# Patient Record
Sex: MALE | Race: WHITE | NOT HISPANIC OR LATINO
[De-identification: names, ages, dates, MRNs, and addresses within clinical notes are randomized per-mention and may not be internally consistent; named-entity substitution may affect disease eponyms.]

---

## 2019-10-15 ENCOUNTER — RECORD ABSTRACTING (OUTPATIENT)
Age: 64
End: 2019-10-15

## 2019-10-18 ENCOUNTER — APPOINTMENT (OUTPATIENT)
Dept: ELECTROPHYSIOLOGY | Facility: CLINIC | Age: 64
End: 2019-10-18
Payer: COMMERCIAL

## 2019-10-18 ENCOUNTER — NON-APPOINTMENT (OUTPATIENT)
Age: 64
End: 2019-10-18

## 2019-10-18 VITALS — SYSTOLIC BLOOD PRESSURE: 138 MMHG | DIASTOLIC BLOOD PRESSURE: 90 MMHG

## 2019-10-18 VITALS
HEIGHT: 72 IN | SYSTOLIC BLOOD PRESSURE: 148 MMHG | DIASTOLIC BLOOD PRESSURE: 98 MMHG | HEART RATE: 78 BPM | BODY MASS INDEX: 32.78 KG/M2 | OXYGEN SATURATION: 98 % | WEIGHT: 242 LBS

## 2019-10-18 DIAGNOSIS — Z98.890 OTHER SPECIFIED POSTPROCEDURAL STATES: ICD-10-CM

## 2019-10-18 DIAGNOSIS — Z87.19 OTHER SPECIFIED POSTPROCEDURAL STATES: ICD-10-CM

## 2019-10-18 DIAGNOSIS — Z78.9 OTHER SPECIFIED HEALTH STATUS: ICD-10-CM

## 2019-10-18 DIAGNOSIS — Z85.831 PERSONAL HISTORY OF MALIGNANT NEOPLASM OF SOFT TISSUE: ICD-10-CM

## 2019-10-18 DIAGNOSIS — Z82.3 FAMILY HISTORY OF STROKE: ICD-10-CM

## 2019-10-18 DIAGNOSIS — Z86.79 OTHER SPECIFIED POSTPROCEDURAL STATES: ICD-10-CM

## 2019-10-18 PROCEDURE — 99205 OFFICE O/P NEW HI 60 MIN: CPT

## 2019-10-18 PROCEDURE — 93000 ELECTROCARDIOGRAM COMPLETE: CPT

## 2019-10-18 RX ORDER — LOSARTAN POTASSIUM 50 MG/1
50 TABLET, FILM COATED ORAL DAILY
Refills: 0 | Status: ACTIVE | COMMUNITY

## 2019-10-18 NOTE — DISCUSSION/SUMMARY
[FreeTextEntry1] : In summary Eloy Jovel is a 63 y/o [place] [employer] and he is here because of recurrent atrial fibrillation.  He went for routine visit with PCP 3 weeks ago who detected afib and referred him to electrophysiology. Originally had afib detected 2011, cardioverted x3-4 times between 4264-3606.  Underwent afib ablation at [facility] in 2012, 2nd ablation at [place] 2015. One subsequent cardioversion the year following 2015.Since then intermittent episodes of PAF.\par History of Gastric bleed detected by endoscopy in 2017, had stopped Pradaxa ~6 months prior to that then switched over to Xarelto of which he stopped taking 3 years ago. Last stress test 3 years ago, and last ECHO 2018 revealing EF 59%.He feels well. Denies palpitations, denies exercise intolerance, denies dizziness or fainting. Goes to the gym regularly-4 times a week and karate once a week\par \par I recommended that he undergo a GI evaluation to risk stratify for anticoagulation.  If AC allowed, then JAMIE cardioversion (patient prefers to be in sinus rhythm).  If AC off ASA.  Risks, benefits and alternatives of cardioversion and anticoagulation discussed with patient and he agrees to see gastroenterologist first. \par \par Sincerely,\par \par Louis Purcell MD

## 2019-10-18 NOTE — PHYSICAL EXAM
[General Appearance - Well Developed] : well developed [Well Groomed] : well groomed [Normal Appearance] : normal appearance [General Appearance - Well Nourished] : well nourished [No Deformities] : no deformities [Normal Conjunctiva] : the conjunctiva exhibited no abnormalities [General Appearance - In No Acute Distress] : no acute distress [Eyelids - No Xanthelasma] : the eyelids demonstrated no xanthelasmas [Normal Oral Mucosa] : normal oral mucosa [No Oral Pallor] : no oral pallor [No Oral Cyanosis] : no oral cyanosis [Normal Jugular Venous A Waves Present] : normal jugular venous A waves present [Normal Jugular Venous V Waves Present] : normal jugular venous V waves present [No Jugular Venous Henao A Waves] : no jugular venous henao A waves [Heart Sounds] : normal S1 and S2 [Murmurs] : no murmurs present [Exaggerated Use Of Accessory Muscles For Inspiration] : no accessory muscle use [Respiration, Rhythm And Depth] : normal respiratory rhythm and effort [Abdomen Soft] : soft [Auscultation Breath Sounds / Voice Sounds] : lungs were clear to auscultation bilaterally [Abdomen Tenderness] : non-tender [Abdomen Mass (___ Cm)] : no abdominal mass palpated [Abnormal Walk] : normal gait [Gait - Sufficient For Exercise Testing] : the gait was sufficient for exercise testing [Nail Clubbing] : no clubbing of the fingernails [Cyanosis, Localized] : no localized cyanosis [Petechial Hemorrhages (___cm)] : no petechial hemorrhages [Skin Color & Pigmentation] : normal skin color and pigmentation [] : no ischemic changes [No Venous Stasis] : no venous stasis [Skin Lesions] : no skin lesions [No Skin Ulcers] : no skin ulcer [No Xanthoma] : no  xanthoma was observed [Oriented To Time, Place, And Person] : oriented to person, place, and time [Affect] : the affect was normal [Mood] : the mood was normal [No Anxiety] : not feeling anxious [FreeTextEntry1] : Irregularly irregular

## 2019-10-18 NOTE — HISTORY OF PRESENT ILLNESS
[FreeTextEntry1] : Perry Frankel, MD\par Dr Malou Wtats\par \par Dear Unruly,\par \par I saw Eloy Jovel on October 18, 2019.  As you know he is a 65 y/o [place] [employer] and he is here because of recurrent atrial fibrillation.  He went for routine visit with PCP 3 weeks ago who detected afib and referred him to electrophysiology. Originally had afib detected 2011, cardioverted x3-4 times between [id number].  Underwent afib ablation at [facility] in 2012, 2nd ablation at [facility] 2015. One subsequent cardioversion the year following 2015.Since then intermittent episodes of PAF.\par History of Gastric bleed detected by endoscopy in 2017, had stopped Pradaxa ~6 months prior to that then switched over to Xarelto of which he stopped taking 3 years ago. Last stress test 3 years ago, and last ECHO 2018 revealing EF 59%.He feels well. Denies palpitations, denies exercise intolerance, denies dizziness or fainting. Goes to the gym regularly-4 times a week and karate once a week

## 2019-10-18 NOTE — REVIEW OF SYSTEMS
[Palpitations] : palpitations [Abdominal Pain] : abdominal pain [Nausea] : nausea [Heartburn] : heartburn [Negative] : Heme/Lymph

## 2020-02-06 ENCOUNTER — INPATIENT (INPATIENT)
Facility: HOSPITAL | Age: 65
LOS: 0 days | Discharge: HOME | DRG: 247 | End: 2020-02-07
Attending: INTERNAL MEDICINE | Admitting: INTERNAL MEDICINE
Payer: COMMERCIAL

## 2020-02-06 ENCOUNTER — TRANSCRIPTION ENCOUNTER (OUTPATIENT)
Age: 65
End: 2020-02-06

## 2020-02-06 VITALS
OXYGEN SATURATION: 96 % | RESPIRATION RATE: 18 BRPM | DIASTOLIC BLOOD PRESSURE: 89 MMHG | HEIGHT: 72 IN | WEIGHT: 240.08 LBS | SYSTOLIC BLOOD PRESSURE: 125 MMHG | HEART RATE: 92 BPM | TEMPERATURE: 98 F

## 2020-02-06 DIAGNOSIS — I21.4 NON-ST ELEVATION (NSTEMI) MYOCARDIAL INFARCTION: ICD-10-CM

## 2020-02-06 DIAGNOSIS — K41.20 BILATERAL FEMORAL HERNIA, WITHOUT OBSTRUCTION OR GANGRENE, NOT SPECIFIED AS RECURRENT: Chronic | ICD-10-CM

## 2020-02-06 DIAGNOSIS — Z98.890 OTHER SPECIFIED POSTPROCEDURAL STATES: Chronic | ICD-10-CM

## 2020-02-06 LAB
ALBUMIN SERPL ELPH-MCNC: 3.9 G/DL — SIGNIFICANT CHANGE UP (ref 3.3–5)
ALP SERPL-CCNC: 75 U/L — SIGNIFICANT CHANGE UP (ref 40–120)
ALT FLD-CCNC: 26 U/L — SIGNIFICANT CHANGE UP (ref 10–45)
ANION GAP SERPL CALC-SCNC: 12 MMOL/L — SIGNIFICANT CHANGE UP (ref 5–17)
APTT BLD: 46.4 SEC — HIGH (ref 27.5–36.3)
AST SERPL-CCNC: 88 U/L — HIGH (ref 10–40)
BILIRUB SERPL-MCNC: 1.3 MG/DL — HIGH (ref 0.2–1.2)
BLD GP AB SCN SERPL QL: NEGATIVE — SIGNIFICANT CHANGE UP
BUN SERPL-MCNC: 16 MG/DL — SIGNIFICANT CHANGE UP (ref 7–23)
CALCIUM SERPL-MCNC: 9.2 MG/DL — SIGNIFICANT CHANGE UP (ref 8.4–10.5)
CHLORIDE SERPL-SCNC: 101 MMOL/L — SIGNIFICANT CHANGE UP (ref 96–108)
CO2 SERPL-SCNC: 25 MMOL/L — SIGNIFICANT CHANGE UP (ref 22–31)
CREAT SERPL-MCNC: 1.06 MG/DL — SIGNIFICANT CHANGE UP (ref 0.5–1.3)
GLUCOSE SERPL-MCNC: 107 MG/DL — HIGH (ref 70–99)
HCT VFR BLD CALC: 48 % — SIGNIFICANT CHANGE UP (ref 39–50)
HGB BLD-MCNC: 16 G/DL — SIGNIFICANT CHANGE UP (ref 13–17)
INR BLD: 1.15 RATIO — SIGNIFICANT CHANGE UP (ref 0.88–1.16)
MCHC RBC-ENTMCNC: 30.4 PG — SIGNIFICANT CHANGE UP (ref 27–34)
MCHC RBC-ENTMCNC: 33.3 GM/DL — SIGNIFICANT CHANGE UP (ref 32–36)
MCV RBC AUTO: 91.3 FL — SIGNIFICANT CHANGE UP (ref 80–100)
NRBC # BLD: 0 /100 WBCS — SIGNIFICANT CHANGE UP (ref 0–0)
NT-PROBNP SERPL-SCNC: 808 PG/ML — HIGH (ref 0–300)
PLATELET # BLD AUTO: 211 K/UL — SIGNIFICANT CHANGE UP (ref 150–400)
POTASSIUM SERPL-MCNC: 3.8 MMOL/L — SIGNIFICANT CHANGE UP (ref 3.5–5.3)
POTASSIUM SERPL-SCNC: 3.8 MMOL/L — SIGNIFICANT CHANGE UP (ref 3.5–5.3)
PROT SERPL-MCNC: 6.9 G/DL — SIGNIFICANT CHANGE UP (ref 6–8.3)
PROTHROM AB SERPL-ACNC: 13.2 SEC — HIGH (ref 10–12.9)
RBC # BLD: 5.26 M/UL — SIGNIFICANT CHANGE UP (ref 4.2–5.8)
RBC # FLD: 12.5 % — SIGNIFICANT CHANGE UP (ref 10.3–14.5)
RH IG SCN BLD-IMP: POSITIVE — SIGNIFICANT CHANGE UP
SODIUM SERPL-SCNC: 138 MMOL/L — SIGNIFICANT CHANGE UP (ref 135–145)
TROPONIN T, HIGH SENSITIVITY RESULT: 599 NG/L — HIGH (ref 0–51)
WBC # BLD: 10.36 K/UL — SIGNIFICANT CHANGE UP (ref 3.8–10.5)
WBC # FLD AUTO: 10.36 K/UL — SIGNIFICANT CHANGE UP (ref 3.8–10.5)

## 2020-02-06 PROCEDURE — 71045 X-RAY EXAM CHEST 1 VIEW: CPT | Mod: 26

## 2020-02-06 PROCEDURE — 99291 CRITICAL CARE FIRST HOUR: CPT

## 2020-02-06 PROCEDURE — 92941 PRQ TRLML REVSC TOT OCCL AMI: CPT | Mod: LC

## 2020-02-06 PROCEDURE — 99152 MOD SED SAME PHYS/QHP 5/>YRS: CPT

## 2020-02-06 PROCEDURE — 93010 ELECTROCARDIOGRAM REPORT: CPT

## 2020-02-06 PROCEDURE — 93454 CORONARY ARTERY ANGIO S&I: CPT | Mod: 26,59

## 2020-02-06 RX ORDER — TICAGRELOR 90 MG/1
90 TABLET ORAL
Refills: 0 | Status: DISCONTINUED | OUTPATIENT
Start: 2020-02-06 | End: 2020-02-06

## 2020-02-06 RX ORDER — ONDANSETRON 8 MG/1
4 TABLET, FILM COATED ORAL ONCE
Refills: 0 | Status: COMPLETED | OUTPATIENT
Start: 2020-02-06 | End: 2020-02-06

## 2020-02-06 RX ORDER — TICAGRELOR 90 MG/1
90 TABLET ORAL EVERY 12 HOURS
Refills: 0 | Status: DISCONTINUED | OUTPATIENT
Start: 2020-02-06 | End: 2020-02-07

## 2020-02-06 RX ORDER — PANTOPRAZOLE SODIUM 20 MG/1
40 TABLET, DELAYED RELEASE ORAL
Refills: 0 | Status: DISCONTINUED | OUTPATIENT
Start: 2020-02-06 | End: 2020-02-07

## 2020-02-06 RX ORDER — ASPIRIN/CALCIUM CARB/MAGNESIUM 324 MG
81 TABLET ORAL DAILY
Refills: 0 | Status: DISCONTINUED | OUTPATIENT
Start: 2020-02-07 | End: 2020-02-07

## 2020-02-06 RX ORDER — HEPARIN SODIUM 5000 [USP'U]/ML
6000 INJECTION INTRAVENOUS; SUBCUTANEOUS EVERY 6 HOURS
Refills: 0 | Status: DISCONTINUED | OUTPATIENT
Start: 2020-02-06 | End: 2020-02-06

## 2020-02-06 RX ORDER — HEPARIN SODIUM 5000 [USP'U]/ML
INJECTION INTRAVENOUS; SUBCUTANEOUS
Qty: 25000 | Refills: 0 | Status: DISCONTINUED | OUTPATIENT
Start: 2020-02-06 | End: 2020-02-06

## 2020-02-06 RX ORDER — SIMVASTATIN 20 MG/1
40 TABLET, FILM COATED ORAL AT BEDTIME
Refills: 0 | Status: DISCONTINUED | OUTPATIENT
Start: 2020-02-06 | End: 2020-02-06

## 2020-02-06 RX ORDER — LOSARTAN POTASSIUM 100 MG/1
50 TABLET, FILM COATED ORAL DAILY
Refills: 0 | Status: DISCONTINUED | OUTPATIENT
Start: 2020-02-06 | End: 2020-02-07

## 2020-02-06 RX ORDER — TICAGRELOR 90 MG/1
1 TABLET ORAL
Qty: 60 | Refills: 0
Start: 2020-02-06 | End: 2020-03-06

## 2020-02-06 RX ORDER — ATORVASTATIN CALCIUM 80 MG/1
40 TABLET, FILM COATED ORAL AT BEDTIME
Refills: 0 | Status: DISCONTINUED | OUTPATIENT
Start: 2020-02-06 | End: 2020-02-07

## 2020-02-06 RX ORDER — METOPROLOL TARTRATE 50 MG
25 TABLET ORAL DAILY
Refills: 0 | Status: DISCONTINUED | OUTPATIENT
Start: 2020-02-06 | End: 2020-02-07

## 2020-02-06 RX ADMIN — Medication 30 MILLILITER(S): at 17:54

## 2020-02-06 RX ADMIN — Medication 25 MILLIGRAM(S): at 17:54

## 2020-02-06 RX ADMIN — TICAGRELOR 90 MILLIGRAM(S): 90 TABLET ORAL at 17:54

## 2020-02-06 RX ADMIN — HEPARIN SODIUM 1000 UNIT(S)/HR: 5000 INJECTION INTRAVENOUS; SUBCUTANEOUS at 09:18

## 2020-02-06 RX ADMIN — ONDANSETRON 4 MILLIGRAM(S): 8 TABLET, FILM COATED ORAL at 09:09

## 2020-02-06 RX ADMIN — ATORVASTATIN CALCIUM 40 MILLIGRAM(S): 80 TABLET, FILM COATED ORAL at 21:38

## 2020-02-06 NOTE — ED PROVIDER NOTE - CLINICAL SUMMARY MEDICAL DECISION MAKING FREE TEXT BOX
Attending MD Millan: 64M with afib (not on AC), HLD presenting as transfer from OSH for NSTEMI, sp ASA and ticagrelor loading with heparin bolus/gtt. On nitro gtt as well. ECG on arrival without diagnostic ischemic changes but review of ECGs from [facility] with ?hyperacute T waves inferiorly and anteriorly. Cardiology consult called on arrival, still with some mild epigastric discomfort. Cath consult called, will continue medical therapies for now until cardiac cath which is tentatively planned for this morning Attending MD Millan: 64M with afib (not on AC), HLD presenting as transfer from OSH for NSTEMI, sp ASA and ticagrelor loading with heparin bolus/gtt. On nitro gtt as well. ECG on arrival without diagnostic ischemic changes but review of ECGs from [facility] with ?hyperacute T waves inferiorly and anteriorly. Cardiology consult called on arrival, still with some mild epigastric discomfort. Cath consult called, will continue medical therapies for now until cardiac cath which is tentatively planned for this morning      Resident: Pt p/w chest pain in setting of NSTEMI transferred for cath consult, given risk factors high risk for ACS and with elevated troponin at OSH. Plan: Cards c/s, rpt EKG, monitoring, admission.  pt already received two different antiplatelet loading doses at OSH and declined aspirin there.

## 2020-02-06 NOTE — H&P CARDIOLOGY - HISTORY OF PRESENT ILLNESS
64 Male with PMHx of  HTN HLD, AF s/p twice, ablation last in 2015 ( not on AC)  p/w 1 week of substernal chest pain radiates ot the left shoulder and back. Pain became worse last night after going to a karate class, did not resolve after resting , then decided to go  to [facility] where was found to have NSTEMI and transferred for cath consult. Patient received ASA 81 (refused rest of dose), plavix 300, brilinta 180, heparin drip, NTG drip, lipitor and protonix at [facility]. Has associated nausea and feeling generally unwell, with mild chest pain at this time.  Patient came to CRS with heparin gtt and NTG gtt. Both d/tanja on arrival to recovery room. Patient is c/o mild chest pain 1/10 now. 64 Male with PMHx of  HTN HLD, AF s/p twice, ablation last in 2015 ( not on AC)  p/w 1 week of substernal chest pain radiates ot the left shoulder and back. Pain became worse last night after going to a karate class, did not resolve after resting , then decided to go  to [facility] where was found to have NSTEMI and transferred for cath consult. Patient received ASA 81 (refused rest of dose), plavix 300, brilinta 180, heparin drip, NTG drip, lipitor and protonix at [facility]. Has associated nausea and feeling generally unwell, with mild chest pain at this time. Troponin is 599 now.  Patient came to CRS with heparin gtt and NTG gtt. Both d/tanja on arrival to recovery room. Patient is c/o mild chest pain 1/10 now. 64 Male with PMHx of  HTN HLD, AF s/p  ablation x 2, last in 2015 ( not on AC)  p/w 1 week of substernal chest pain radiates ot the left shoulder and back. Pain became worse last night after going to a karate class, did not resolve after resting , then decided to go  to [facility] where was found to have NSTEMI and transferred to [facility] for. Patient received ASA 81 (refused rest of dose), plavix 300, brilinta 180, heparin drip, NTG drip, lipitor and protonix at [facility]. Has associated nausea and feeling generally unwell, with mild chest pain at this time. Troponin is 599 now.  Patient came to CRS with heparin gtt and NTG gtt. Both d/tanja on arrival to recovery room. Patient is c/o mild chest pain 1/10 now.

## 2020-02-06 NOTE — ED PROVIDER NOTE - NS ED ROS FT
Gen: No fever, normal appetite  Eyes: No eye irritation or discharge  ENT: No ear pain, congestion, sore throat  Resp: No cough or trouble breathing  Cardiovascular: chest pain  Gastroenteric: nausea  :  No change in urine output; no dysuria  MS: No joint or muscle pain  Skin: No rashes  Neuro: No headache; no abnormal movements  Remainder negative, except as per the HPI

## 2020-02-06 NOTE — ED ADULT NURSE NOTE - CHPI ED NUR SYMPTOMS NEG
no back pain/no fever/no chest pain/no shortness of breath/no diaphoresis/no congestion/no vomiting/no dizziness

## 2020-02-06 NOTE — H&P CARDIOLOGY - PMH
Atrial fibrillation  Not on AC  HLD (hyperlipidemia)    HTN (hypertension) Atrial fibrillation  Not on AC  GI bleed  Endoscopy done  HLD (hyperlipidemia)    HTN (hypertension)

## 2020-02-06 NOTE — CHART NOTE - NSCHARTNOTEFT_GEN_A_CORE
Patient seen and evaluated at bedside    Chief Complaint:    HPI:      PMHx:       PSHx:       Allergies:  penicillins (Unknown)      Home Meds:    Current Medications:   heparin  Infusion.  Unit(s)/Hr IV Continuous <Continuous>  heparin  Injectable 6000 Unit(s) IV Push every 6 hours PRN      FAMILY HISTORY:      Social History:  Smoking History:  Alcohol Use:  Drug Use:    REVIEW OF SYSTEMS:  CONSTITUTIONAL: No weakness, fevers or chills  EYES/ENT: No visual changes;  No dysphagia  NECK: No pain or stiffness  RESPIRATORY: No cough, wheezing, hemoptysis; No shortness of breath  CARDIOVASCULAR: No chest pain or palpitations; No lower extremity edema  GASTROINTESTINAL: No abdominal or epigastric pain. No nausea, vomiting, or hematemesis; No diarrhea or constipation. No melena or hematochezia.  BACK: No back pain  GENITOURINARY: No dysuria, frequency or hematuria  NEUROLOGICAL: No numbness or weakness  SKIN: No itching, burning, rashes, or lesions   All other review of systems is negative unless indicated above.    Physical Exam:  T(F): 98.4 (02-06), Max: 98.4 (02-06)  HR: 77 (02-06) (70 - 92)  BP: 110/81 (02-06) (102/69 - 150/120)  RR: 18 (02-06)  SpO2: 96% (02-06)  GENERAL: No acute distress, well-developed  HEAD:  Atraumatic, Normocephalic  ENT: EOMI, PERRLA, conjunctiva and sclera clear, Neck supple, No JVD, moist mucosa  CHEST/LUNG: Clear to auscultation bilaterally; No wheeze, equal breath sounds bilaterally   BACK: No spinal tenderness  HEART: Regular rate and rhythm; No murmurs, rubs, or gallops  ABDOMEN: Soft, Nontender, Nondistended; Bowel sounds present  EXTREMITIES:  No clubbing, cyanosis, or edema  PSYCH: Nl behavior, nl affect  NEUROLOGY: AAOx3, non-focal, cranial nerves intact  SKIN: Normal color, No rashes or lesions  LINES:    Cardiovascular Diagnostic Testing:    ECG: Personally reviewed:    Echo: Personally reviewed:    Stress Testing:    Cath:    Imaging:    CXR: Personally reviewed    Labs: Personally reviewed                        16.0   10.36 )-----------( 211      ( 06 Feb 2020 09:08 )             48.0     02-06    138  |  101  |  16  ----------------------------<  107<H>  3.8   |  25  |  1.06    Ca    9.2      06 Feb 2020 09:08    TPro  6.9  /  Alb  3.9  /  TBili  1.3<H>  /  DBili  x   /  AST  88<H>  /  ALT  26  /  AlkPhos  75  02-06    PTT - ( 06 Feb 2020 09:08 )  PTT:46.4 sec      Serum Pro-Brain Natriuretic Peptide: 808 pg/mL (02-06 @ 09:08) Patient seen and evaluated at bedside    Chief Complaint: Chest pain    HPI:  Patient is a 63 yo M with PMH of HTN, HLD, a fib (s/p failed DCCV and ablation x 2, last in 2015) not on AC 2/2 gastritis (history of EGD without ulcers), who presents from [facility] CCU for evaluation of NSTEMI with persistent chest pain while on nitroglycerin gtt. Cardiology consulted for further evaluation.  Patient reports retrosternal chest pain that occurred after karate class, radiating to back and left shoulder, with changes in vision. No dyspnea, palpitation, headaches. Patient received aspirin 81mg (refused 325mg), Plavix 300mg x 1, and heparin bolus and drip. Admitted to OSH CCU with elevated troponin 1 (no known range) for NSTEMI, however with persistent chest pain. Started on nitro gtt and titrated up without significant improvement in symptoms, transferred to [facility] for urgent catheterization.  On exam, patient reports chest pain has now resolved, however with some epigastric discomfort, reports having issues with stomach upset when taking aspirin on empty stomach in past.    PMHx:       PSHx:       Allergies:  penicillins (Unknown)    Home Meds:      Current Medications:   heparin  Infusion.  Unit(s)/Hr IV Continuous <Continuous>  heparin  Injectable 6000 Unit(s) IV Push every 6 hours PRN    FAMILY HISTORY: Reports cardiac history in father and uncles    Social History:  Smoking History: Denies    REVIEW OF SYSTEMS:  CONSTITUTIONAL: No weakness, fevers or chills  EYES/ENT: No visual changes;  No dysphagia  NECK: No pain or stiffness  RESPIRATORY: No cough, wheezing, hemoptysis; No shortness of breath  CARDIOVASCULAR: No palpitations; No lower extremity edema; +chest pain  GASTROINTESTINAL: No abdominal or epigastric pain. No nausea, vomiting, or hematemesis; No diarrhea or constipation. No melena or hematochezia.  BACK: No back pain  GENITOURINARY: No dysuria, frequency or hematuria  NEUROLOGICAL: No numbness or weakness  SKIN: No itching, burning, rashes, or lesions   All other review of systems is negative unless indicated above.    Physical Exam:  T(F): 98.4 (02-06), Max: 98.4 (02-06)  HR: 77 (02-06) (70 - 92)  BP: 110/81 (02-06) (102/69 - 150/120)  RR: 18 (02-06)  SpO2: 96% (02-06)  GENERAL: No acute distress, well-developed  HEAD:  Atraumatic, Normocephalic  ENT: EOMI, conjunctiva and sclera clear, Neck supple, No JVD  CHEST/LUNG: Clear to auscultation bilaterally anteriorly  BACK: No spinal tenderness  HEART: Regular rate and rhythm; equal S1 and S2, no murmurs, rubs  ABDOMEN: Soft, mildly distended, nontender to palpation  EXTREMITIES:  No clubbing, cyanosis, or edema  PSYCH: Nl behavior, nl affect  NEUROLOGY: AAOx3, non-focal, cranial nerves grossly intact  SKIN: Normal color    Cardiovascular Diagnostic Testing:  ECG: atrial fibrillation with dynamic T wave changes of V2-V3    Labs: Personally reviewed                        16.0   10.36 )-----------( 211      ( 06 Feb 2020 09:08 )             48.0     02-06    138  |  101  |  16  ----------------------------<  107<H>  3.8   |  25  |  1.06    Ca    9.2      06 Feb 2020 09:08    TPro  6.9  /  Alb  3.9  /  TBili  1.3<H>  /  DBili  x   /  AST  88<H>  /  ALT  26  /  AlkPhos  75  02-06    PTT - ( 06 Feb 2020 09:08 )  PTT:46.4 sec    Serum Pro-Brain Natriuretic Peptide: 808 pg/mL (02-06 @ 09:08)    Assessment & Plan:  63 yo M with PMH of HTN, HLD, a fib (s/p failed DCCV and ablation x 2, last in 2015) not on AC 2/2 gastritis (history of EGD without ulcers), who presents from [facility] CCU for evaluation of NSTEMI with persistent chest pain while on nitroglycerin gtt. Cardiology consulted for further evaluation.    #NSTEMI  - With persistent chest pain despite nitro gtt, EKG with dynamic changes without STEMI  - Will plan for urgent [facility], consent obtained  - Start aspirin 81mg daily and Brilinta 90mg BID tomorrow, patient understands need for DAPT, feels comfortable he can be compliant despite history of gastritis; of note, patient will need triple therapy x 1 month given a fib  - Please check A1c, lipid panel, TSH    Alison Lobo MD  Cardiology Fellow  [phone]  All Cardiology service information can be found 24/7 on [email or web address], password: [email or web address]

## 2020-02-06 NOTE — ED PROVIDER NOTE - ATTENDING CONTRIBUTION TO CARE
Attending MD Millan:  I personally have seen and examined this patient.  Resident note reviewed and agree on plan of care and except where noted.  See HPI, PE, and MDM for details.

## 2020-02-06 NOTE — DISCHARGE NOTE PROVIDER - HOSPITAL COURSE
HPI:    64 Male with PMHx of  HTN HLD, AF s/p  ablation x 2, last in 2015 ( not on AC)  p/w 1 week of substernal chest pain radiates ot the left shoulder and back. Pain became worse last night after going to a karate class, did not resolve after resting , then decided to go  to [facility] where was found to have NSTEMI and transferred to [facility] for. Patient received ASA 81 (refused rest of dose), plavix 300, brilinta 180, heparin drip, NTG drip, lipitor and protonix at [facility]. Has associated nausea and feeling generally unwell, with mild chest pain at this time. Troponin is 599 now.    Patient came to CRS with heparin gtt and NTG gtt. Both d/tanja on arrival to recovery room. Patient is c/o mild chest pain 1/10 now. (06 Feb 2020 09:45)        2/6 cardiac cath with 2 stents to the distal circ. Right groin site without swelling, bleeding. HPI:    64 Male with PMHx of  HTN HLD, AF s/p  ablation x 2, last in 2015 ( not on AC)  p/w 1 week of substernal chest pain radiates ot the left shoulder and back. Pain became worse last night after going to a karate class, did not resolve after resting , then decided to go  to [facility] where was found to have NSTEMI and transferred to [facility] for. Patient received ASA 81 (refused rest of dose), plavix 300, brilinta 180, heparin drip, NTG drip, lipitor and protonix at [facility]. Has associated nausea and feeling generally unwell, with mild chest pain at this time. Troponin is 599 now.    Patient came to [facility] with heparin gtt and NTG gtt. Both d/tanja on arrival to recovery room. Patient is c/o mild chest pain 1/10 now. (06 Feb 2020 09:45)        2/6 cardiac cath with 2 stents to the distal circ. Right groin site without swelling, bleeding.        < from: Transthoracic Echocardiogram (02.07.20 @ 10:12) >        EF (Visual Estimate): 50 %    Doppler Peak Velocity (m/sec): AoV=1.0    ------------------------------------------------------------------------    Observations:    Mitral Valve: Normal mitral valve. Mild mitral    regurgitation.    Aortic Valve/Aorta: Calcified trileaflet aortic valve with    normal opening. Peak transaortic valve gradient equals 4 mm    Hg. Mild aortic regurgitation.  Peak left ventricular    outflow tract gradient equals 3 mm Hg, LVOT velocity time    integral equals 15 cm.    Aortic Root: 4.2 cm.    Left Atrium: Normal left atrium.    Left Ventricle: Endocardium not well visualized;    hypokinesis of the proximal inferolateral wall. Normal left    ventricular internal dimensions and wall thicknesses.    Right Heart: Normal right atrium. Normal right ventricular    size and function. Normal tricuspid valve. Normal pulmonic    valve.    Pericardium/Pleura: Normal pericardium with no pericardial    effusion.    Hemodynamic: Estimated right atrial pressure is 8 mm Hg.    ------------------------------------------------------------------------    Conclusions:    1. Normal left ventricular internal dimensions and wall    thicknesses.    2. Endocardium not well visualized; hypokinesis of the    proximal inferolateral wall.        < end of copied text > HPI:    64 Male with PMHx of  HTN HLD, AF s/p  ablation x 2, last in 2015 ( not on AC)  p/w 1 week of substernal chest pain radiates ot the left shoulder and back. Pain became worse last night after going to a karate class, did not resolve after resting , then decided to go  to [facility] where was found to have NSTEMI and transferred to [facility] for. Patient received ASA 81 (refused rest of dose), plavix 300, brilinta 180, heparin drip, NTG drip, lipitor and protonix at [facility]. Has associated nausea and feeling generally unwell, with mild chest pain at this time. Troponin is 599 now.    Patient came to [facility] with heparin gtt and NTG gtt. Both d/tanja on arrival to recovery room. Patient is c/o mild chest pain 1/10 now. (06 Feb 2020 09:45)        2/6 cardiac cath with 2 stents to the distal circ. Right groin site without swelling, bleeding.    2/7 as per Dr Zaira turner to start Eliquis, pt will be on triple therapy ( Aspirin, Brilinta and Eliquis) for 1 month then pt will stop Aspirin and cont Brilinta and Eliquis     pt aware needs to make appoitment with cardiologist and follow up in 1-2 weeks         < from: Transthoracic Echocardiogram (02.07.20 @ 10:12) >        EF (Visual Estimate): 50 %    Doppler Peak Velocity (m/sec): AoV=1.0    ------------------------------------------------------------------------    Observations:    Mitral Valve: Normal mitral valve. Mild mitral    regurgitation.    Aortic Valve/Aorta: Calcified trileaflet aortic valve with    normal opening. Peak transaortic valve gradient equals 4 mm    Hg. Mild aortic regurgitation.  Peak left ventricular    outflow tract gradient equals 3 mm Hg, LVOT velocity time    integral equals 15 cm.    Aortic Root: 4.2 cm.    Left Atrium: Normal left atrium.    Left Ventricle: Endocardium not well visualized;    hypokinesis of the proximal inferolateral wall. Normal left    ventricular internal dimensions and wall thicknesses.    Right Heart: Normal right atrium. Normal right ventricular    size and function. Normal tricuspid valve. Normal pulmonic    valve.    Pericardium/Pleura: Normal pericardium with no pericardial    effusion.    Hemodynamic: Estimated right atrial pressure is 8 mm Hg.    ------------------------------------------------------------------------    Conclusions:    1. Normal left ventricular internal dimensions and wall    thicknesses.    2. Endocardium not well visualized; hypokinesis of the    proximal inferolateral wall.        < end of copied text >

## 2020-02-06 NOTE — CHART NOTE - NSCHARTNOTEFT_GEN_A_CORE
Removal of Femoral Sheath    Pulses in the (right lower extremity are (palpable  The patient was placed in the supine position. The insertion site was identified and the sutures were removed per protocol.  The __6FA__ Austrian femoral sheath was then removed. Direct pressure was applied for  __25____ minutes.     Monitoring of the (right  groin and both lower extremities including neuro-vascular checks and vital signs every 15 minutes x 4, then every 30 minutes x 2, then every 1 hour was ordered.    Complications: None    Comments:  post sheath pull   site is soft, non tender   no hematoma, no bleeding +DP Removal of Femoral Sheath    Pulses in the (right lower extremity are (palpable  The patient was placed in the supine position. The insertion site was identified and the sutures were removed per protocol.  The __6FA__ Gabonese femoral sheath was then removed. Direct pressure was applied for  __25____ minutes.     Monitoring of the (right  groin and both lower extremities including neuro-vascular checks and vital signs every 15 minutes x 4, then every 30 minutes x 2, then every 1 hour was ordered.    Complications: None    Comments:  post sheath pull   site is soft, non tender   no hematoma, no bleeding +DP      Admitted under Dr Trevino/cards consult as d/w Dr dale

## 2020-02-06 NOTE — CONSULT NOTE ADULT - SUBJECTIVE AND OBJECTIVE BOX
History of Present Illness		  64 Male with PMHx of  HTN HLD, AF s/p  ablation x 2, last in 2015 ( not on AC)  p/w 1 week of substernal chest pain radiates ot the left shoulder and back. Pain became worse last night after going to a karate class, did not resolve after resting , then decided to go  to [facility] where was found to have NSTEMI and transferred to [facility] for. Patient received ASA 81 (refused rest of dose), plavix 300, brilinta 180, heparin drip, NTG drip, lipitor and protonix at [facility]. Has associated nausea and feeling generally unwell, with mild chest pain at this time. Troponin is 599 now.  Patient came to [facility] with heparin gtt and NTG gtt. Both d/tanja on arrival to recovery room. Patient is c/o mild chest pain 1/10 now.      Past Medical History:  Atrial fibrillation  Not on AC  GI bleed  Endoscopy done  HLD (hyperlipidemia)    HTN (hypertension).    Past Surgical History:  Bilateral femoral hernia    H/O hand surgery  Left hand  History of nasal surgery.      Social History:  · Marital Status		  · Lives With	spouse	    Substance Use History:  · Substance Use	never used	    Alcohol Use History:  · Alcohol Use Comment	Occasional	    Tobacco Usage:  · Tobacco Usage: Never smoker	      Home Medications:   * Patient Currently Takes Medications as of 06-Feb-2020 10:28 documented in Structured Notes  · 	losartan 50 mg oral tablet: Last Dose Taken:  , 1 tab(s) orally once a day  · 	omeprazole 40 mg oral delayed release capsule: Last Dose Taken:  , 1 cap(s) orally once a day  · 	simvastatin 10 mg oral tablet: Last Dose Taken:  , 1 tab(s) orally once a day (at bedtime)  · 	metoprolol succinate 25 mg oral tablet, extended release: Last Dose Taken:  , 1 tab(s) orally once a day    Review of Systems:   Respiratory / Cardiology / Neurology:  · Respiratory and Thorax	negative	  · Cardiovascular Symptoms	chest pain	  · Neurological	negative	    Vital Signs/Physical Exam:   Height/Weight:  · 	 used	  · Weight (kg)	106.7 kg	  · Weight  (lbs)	235.2 Pound(s)	  · Height in feet	6 Feet	  · Height (in)	0 Inch(s)	  · Height (cm)	182.88 Centimeter(s)	  · BMI (kg/m2)	31.9	  · BSA (m2)	2.28 Meter Squared	    T/HR/RR/BP:  · Heart Rate	95 /min	  · Respiration Rate (breaths/min)	14 /min	  · BP Systolic	128 mm Hg	  · BP Diastolic	75 mm Hg	  · Blood Pressure - Method	electronic	  · BP Noninvasive Mean	92 mm Hg	  · SpO2 (%)	97 %	  · O2 delivery	room air	    Physical Exam:  · Constitutional	Well-developed, well nourished	  · Neck	No bruits; no thyromegaly or nodules	  · Respiratory	Breath Sounds equal & clear to percussion & auscultation, no accessory muscle use	  · Cardiovascular	Regular rate & rhythm, normal S1, S2; no murmurs, gallops or rubs; no S3, S4	  · Gastrointestinal	Soft, non-tender, no hepatosplenomegaly, normal bowel sounds	  · Extremities	No cyanosis, clubbing or edema	  · Vascular	Equal and normal pulses (carotid, femoral, dorsalis pedis)	  · Neurological	Alert & oriented; no sensory, motor or coordination deficits, normal reflexes	  · Psychiatric	Affect and characteristics of appearance, verbalizations, behaviors are appropriate	    Results:   Comments:  · EKG and Interpretation	AF@ 78 BPM	      Assessment /  Plan:  Patient referred for Cardiac catherization s/p stent , troponin found to be elevated   transfer under medicine for further monitoring       Electronic Signatures:

## 2020-02-06 NOTE — CHART NOTE - NSCHARTNOTEFT_GEN_A_CORE
right groin hematoma at sheath site 2 x 2 inch, oozing at site  6FA sheath in place    +DP intact RLE    manually compressed  Dr Sabillon at bedside  fem stop applied   continue close monitoring   recheck ACT in 40 min

## 2020-02-06 NOTE — ED ADULT NURSE NOTE - NSIMPLEMENTINTERV_GEN_ALL_ED
Implemented All Fall with Harm Risk Interventions:  [place] to call system. Call bell, personal items and telephone within reach. Instruct patient to call for assistance. Room bathroom lighting operational. Non-slip footwear when patient is off stretcher. Physically safe environment: no spills, clutter or unnecessary equipment. Stretcher in lowest position, wheels locked, appropriate side rails in place. Provide visual cue, wrist band, yellow gown, etc. Monitor gait and stability. Monitor for mental status changes and reorient to person, place, and time. Review medications for side effects contributing to fall risk. Reinforce activity limits and safety measures with patient and family. Provide visual clues: red socks.

## 2020-02-06 NOTE — ED PROVIDER NOTE - CRITICAL CARE PROVIDED
interpretation of diagnostic studies/documentation/direct patient care (not related to procedure)/consultation with other physicians

## 2020-02-06 NOTE — ED ADULT NURSE NOTE - OBJECTIVE STATEMENT
0815 64 yr old WM brought to ER via ambulance on stretcher for further eval and tx by Cardiology. transferred from [facility]. s/p NSTEMI. and elevated troponins. c/o chest pain x 1 wk. worse since yesterday. Upon arrival to Er no c/o chest pain, palp, SOB or dizziness. c/o abd pain, periumbillical region and feeling bloated. abd distended. IV intact RACF  without sx of infilt with Heparin drip infusing at 10.9 cc/hr and NTG drip at 3cc/hr..

## 2020-02-06 NOTE — DISCHARGE NOTE PROVIDER - NSDCCPTREATMENT_GEN_ALL_CORE_FT
PRINCIPAL PROCEDURE  Procedure: Placement of coronary artery stent  Findings and Treatment: 2 distal circ stents

## 2020-02-06 NOTE — ED PROVIDER NOTE - OBJECTIVE STATEMENT
64 M HTN HLD AF s/p twice, ablation last in 2015 p/w 1 week of substernal chest pain radiates ot the left shoulder and back. Pain became worse last night after gong to a karate class hence presentation to [facility] where was found to have NSTEMI and transferred for cath consult. 64 M HTN HLD AF s/p twice, ablation last in 2015 p/w 1 week of substernal chest pain radiates ot the left shoulder and back. Pain became worse last night after going to a karate class, did not resolve after resting; hence presentation to [facility] where was found to have NSTEMI and transferred for cath consult. Has been having similar exertional chest pain for the last week. Rec'd ASA 8 (refused rest of dose), plavix 300, brilinta 180, heparin drip, NTG drip, lipitor and protonix at [facility]. Has associated nausea and feeling generally unwell. 64 M HTN HLD AF s/p twice, ablation last in 2015 p/w 1 week of substernal chest pain radiates ot the left shoulder and back. Pain became worse last night after going to a karate class, did not resolve after resting; hence presentation to [facility] where was found to have NSTEMI and transferred for cath consult. Has been having similar exertional chest pain for the last week. Rec'd ASA 8 (refused rest of dose), plavix 300, brilinta 180, heparin drip, NTG drip, lipitor and protonix at [facility]. Has associated nausea and feeling generally unwell, with mild chest pain at this time.

## 2020-02-06 NOTE — DISCHARGE NOTE PROVIDER - NSDCCPCAREPLAN_GEN_ALL_CORE_FT
PRINCIPAL DISCHARGE DIAGNOSIS  Diagnosis: CAD (coronary artery disease), native coronary artery  Assessment and Plan of Treatment: Do not stop you Aspirin or Brilinta unless instructed to do so by your cardiologist, they help keep your stented arteries open.   No heavy lifting, strenuous activity, bending, straining, or unnecessary stair climbing for 2 weeks. No driving for 2 days. You may shower 24 hours following the procedure but avoid baths/swimming for 1 week. Check your groin site for bleeding and/or swelling daily following procedure and call your doctor immediately if it occurs or if you experience increased pain at the site. Follow up with your cardiologist in 1-2 weeks. You may call [facility] if you have any questions/concerns regarding your procedure [phone].      SECONDARY DISCHARGE DIAGNOSES  Diagnosis: HTN (hypertension)  Assessment and Plan of Treatment: Continue with your blood pressure medications; eat a heart healthy diet with low salt diet; exercise regularly (consult with your physician or cardiologist first); maintain a heart healthy weight; if you smoke - quit (A resource to help you stop smoking is the [facility] [employer] – phone number [phone].); include healthy ways to manage stress. Continue to follow with your primary care physician or cardiologist.    Diagnosis: HLD (hyperlipidemia)  Assessment and Plan of Treatment: Continue with your cholesterol medications. Eat a heart healthy diet that is low in saturated fats and salt, and includes whole grains, fruits, vegetables and lean protein; exercise regularly (consult with your physician or cardiologist first); maintain a heart healthy weight; if you smoke - quit (A resource to help you stop smoking is the [facility] [employer] for Tobacco Control – phone number [phone].). Continue to follow with your primary physician or cardiologist.

## 2020-02-06 NOTE — DISCHARGE NOTE PROVIDER - NSDCMRMEDTOKEN_GEN_ALL_CORE_FT
losartan 50 mg oral tablet: 1 tab(s) orally once a day  metoprolol succinate 25 mg oral tablet, extended release: 1 tab(s) orally once a day  omeprazole 40 mg oral delayed release capsule: 1 cap(s) orally once a day  simvastatin 10 mg oral tablet: 1 tab(s) orally once a day (at bedtime)  ticagrelor 90 mg oral tablet: 1 tab(s) orally every 12 hours MDD:2 apixaban 5 mg oral tablet: 1 tab(s) orally every 12 hours  aspirin 81 mg oral tablet, chewable: 1 tab(s) orally once a day  losartan 50 mg oral tablet: 1 tab(s) orally once a day  metoprolol succinate 25 mg oral tablet, extended release: 1 tab(s) orally once a day  pantoprazole 40 mg oral delayed release tablet: 1 tab(s) orally once a day (before a meal)  simvastatin 10 mg oral tablet: 1 tab(s) orally once a day (at bedtime)  ticagrelor 90 mg oral tablet: 1 tab(s) orally every 12 hours apixaban 5 mg oral tablet: 1 tab(s) orally every 12 hours  aspirin 81 mg oral tablet, chewable: 1 tab(s) orally once a day  atorvastatin 80 mg oral tablet: 1 tab(s) orally once a day (at bedtime) MDD:1  losartan 50 mg oral tablet: 1 tab(s) orally once a day  metoprolol succinate 25 mg oral tablet, extended release: 1 tab(s) orally once a day  pantoprazole 40 mg oral delayed release tablet: 1 tab(s) orally once a day (before a meal)  ticagrelor 90 mg oral tablet: 1 tab(s) orally every 12 hours

## 2020-02-06 NOTE — CONSULT NOTE ADULT - SUBJECTIVE AND OBJECTIVE BOX
Patient seen and evaluated at bedside    Chief Complaint: NSTEMI    HPI:  64 Male with PMHx of  HTN HLD, AF s/p  ablation x 2, last in 2015 ( not on AC)  p/w 1 week of substernal chest pain radiates ot the left shoulder and back. Pain became worse last night after going to a karate class, did not resolve after resting , then decided to go  to [facility] where was found to have NSTEMI and transferred to [facility] for. Patient received ASA 81 (refused rest of dose), plavix 300, brilinta 180, heparin drip, NTG drip, lipitor and protonix at [facility]. Has associated nausea and feeling generally unwell, with mild chest pain at this time. Troponin is 599 now.  Patient came to [facility] with heparin gtt and NTG gtt. Both d/tanja on arrival to recovery room. Patient is c/o mild chest pain 1/10 now. (06 Feb 2020 09:45)    I have reviewed the above hx from primary team. Briefly, 65 y/o M w/ PMH of HTN, HLD, AF s/p ablation x 2 not on a/c (2/2 gastritis?) c/o CP x 1D. Pt states that he had one ep of epigastric abd pain and substernal CP ~1W ago. It was associated w/ much belching. He assumed it was related to indigestion and waited out his sxs, which eventually resolved. He was then doin gwell until 1D PTA when he was at the gym and afterwards developed severe substernal chest discomfort radiating to his L arm. No associated dyspnea/nausea/palpitations/diaphoresis. On arrival, noted to have elev hs-cTn and LHC revealed severe LCx dz s/p BEATRIZ. Pt denies any complaints at time of my interview/exam.    PMHx:   GI bleed  Atrial fibrillation  HLD (hyperlipidemia)  HTN (hypertension)      PSHx:   History of nasal surgery  Bilateral femoral hernia  H/O hand surgery      Allergies:  penicillins (Unknown)      Home Meds: Reviewed    Current Medications:   atorvastatin 40 milliGRAM(s) Oral at bedtime  losartan 50 milliGRAM(s) Oral daily  metoprolol succinate ER 25 milliGRAM(s) Oral daily  pantoprazole    Tablet 40 milliGRAM(s) Oral before breakfast  ticagrelor 90 milliGRAM(s) Oral every 12 hours      FAMILY HISTORY: Noncontributory      Social History:  Smoking History: Denies  Alcohol Use: Denies  Drug Use: Denies    REVIEW OF SYSTEMS:  CONSTITUTIONAL: No weakness, fevers or chills  EYES/ENT: No visual changes;  No dysphagia  NECK: No pain or stiffness  RESPIRATORY: No cough, wheezing, hemoptysis; No shortness of breath  CARDIOVASCULAR: +CP  GASTROINTESTINAL: No abdominal or epigastric pain. No nausea, vomiting, or hematemesis; No diarrhea or constipation. No melena or hematochezia.  BACK: No back pain  GENITOURINARY: No dysuria, frequency or hematuria  NEUROLOGICAL: No numbness or weakness  SKIN: No itching, burning, rashes, or lesions   All other review of systems is negative unless indicated above.    Physical Exam:  T(F): 98.4 (02-06), Max: 98.4 (02-06)  HR: 86 (02-06) (63 - 95)  BP: 126/79 (02-06) (102/69 - 150/120)  RR: 18 (02-06)  SpO2: 97% (02-06)  Gen: NAD.  HEENT: NCAT. PERRLA b/l.  Neck: No JVP elev.  CV: Normal S1, S2. Irreg irreg. No MRG.  Chest: CTAB. No WRR.  Abd: +BSx4. Soft. NTND.  Ext: No LE edema.  Skin: No cyanosis.    Cardiovascular Diagnostic Testing:    ECG: AF w/ VR in goal range. L axis deviation. Nonspecific TWF in lateral leads.    Stress Testing:    Cath: < from: Cardiac Cath Lab - Adult (02.06.20 @ 10:14) >  CORONARY VESSELS: The coronary circulation is right dominant.  LM:   --  LM: Angiography showed mild atherosclerosis with no flow limiting  lesions.  LAD:   --  Proximal LAD: There was a 20 % stenosis.  --  D1: There was a 40 % stenosis.  CX:   --  Distal circumflex: There was a diffuse 99 % stenosis.  --  OM3: There was a 90 % stenosis.  RCA:   --  Proximal RCA: Angiography showed mild to moderate diffuse  atherosclerosis with no flow limiting lesions. There was a 20 % stenosis.  COMPLICATIONS: There were no complications.    Imaging:    CXR: Personally reviewed. Clear lungs.    Labs: Personally reviewed                        16.0   10.36 )-----------( 211      ( 06 Feb 2020 09:08 )             48.0     02-06    138  |  101  |  16  ----------------------------<  107<H>  3.8   |  25  |  1.06    Ca    9.2      06 Feb 2020 09:08    TPro  6.9  /  Alb  3.9  /  TBili  1.3<H>  /  DBili  x   /  AST  88<H>  /  ALT  26  /  AlkPhos  75  02-06    PT/INR - ( 06 Feb 2020 09:08 )   PT: 13.2 sec;   INR: 1.15 ratio         PTT - ( 06 Feb 2020 09:08 )  PTT:46.4 sec    CARDIAC MARKERS ( 06 Feb 2020 09:08 )  599 ng/L / x     / x     / x     / x     / x            Serum Pro-Brain Natriuretic Peptide: 808 pg/mL (02-06 @ 09:08)

## 2020-02-06 NOTE — DISCHARGE NOTE PROVIDER - CARE PROVIDER_API CALL
Derick Schroeder  [address] [place] [address]  [phone]  Phone: (   )    -  Fax: (   )    -  Follow Up Time:     Goran Feliciano (DO)  Gastroenterology  [address] [place] [address]  Phone: [phone]  Fax: [phone]  Follow Up Time:

## 2020-02-06 NOTE — CHART NOTE - NSCHARTNOTEFT_GEN_A_CORE
[facility] ER called to verify antiplatelets given overnight  As per ER Attending patient received the following at [facility] overnight     Plavix 300mg at midnight 2/6/2020  ASA 325mg at 12:30 AM 2/6/2020    Brilinta 180mg 06:00 AM 2/6/2020  ASA 81mg po 06:00 2/6/2020

## 2020-02-07 ENCOUNTER — TRANSCRIPTION ENCOUNTER (OUTPATIENT)
Age: 65
End: 2020-02-07

## 2020-02-07 VITALS
SYSTOLIC BLOOD PRESSURE: 122 MMHG | DIASTOLIC BLOOD PRESSURE: 82 MMHG | RESPIRATION RATE: 18 BRPM | OXYGEN SATURATION: 97 % | HEART RATE: 79 BPM | TEMPERATURE: 99 F

## 2020-02-07 DIAGNOSIS — I25.10 ATHEROSCLEROTIC HEART DISEASE OF NATIVE CORONARY ARTERY WITHOUT ANGINA PECTORIS: ICD-10-CM

## 2020-02-07 DIAGNOSIS — I10 ESSENTIAL (PRIMARY) HYPERTENSION: ICD-10-CM

## 2020-02-07 DIAGNOSIS — K92.2 GASTROINTESTINAL HEMORRHAGE, UNSPECIFIED: ICD-10-CM

## 2020-02-07 DIAGNOSIS — I48.91 UNSPECIFIED ATRIAL FIBRILLATION: ICD-10-CM

## 2020-02-07 DIAGNOSIS — E78.5 HYPERLIPIDEMIA, UNSPECIFIED: ICD-10-CM

## 2020-02-07 LAB
ANION GAP SERPL CALC-SCNC: 12 MMOL/L — SIGNIFICANT CHANGE UP (ref 5–17)
APTT BLD: 31.8 SEC — SIGNIFICANT CHANGE UP (ref 27.5–36.3)
BUN SERPL-MCNC: 14 MG/DL — SIGNIFICANT CHANGE UP (ref 7–23)
CALCIUM SERPL-MCNC: 9.2 MG/DL — SIGNIFICANT CHANGE UP (ref 8.4–10.5)
CHLORIDE SERPL-SCNC: 101 MMOL/L — SIGNIFICANT CHANGE UP (ref 96–108)
CHOLEST SERPL-MCNC: 157 MG/DL — SIGNIFICANT CHANGE UP (ref 10–199)
CO2 SERPL-SCNC: 24 MMOL/L — SIGNIFICANT CHANGE UP (ref 22–31)
CREAT SERPL-MCNC: 1.19 MG/DL — SIGNIFICANT CHANGE UP (ref 0.5–1.3)
GLUCOSE SERPL-MCNC: 111 MG/DL — HIGH (ref 70–99)
HCT VFR BLD CALC: 49.1 % — SIGNIFICANT CHANGE UP (ref 39–50)
HDLC SERPL-MCNC: 33 MG/DL — LOW
HGB BLD-MCNC: 16 G/DL — SIGNIFICANT CHANGE UP (ref 13–17)
LIPID PNL WITH DIRECT LDL SERPL: 77 MG/DL — SIGNIFICANT CHANGE UP
MCHC RBC-ENTMCNC: 30.1 PG — SIGNIFICANT CHANGE UP (ref 27–34)
MCHC RBC-ENTMCNC: 32.6 GM/DL — SIGNIFICANT CHANGE UP (ref 32–36)
MCV RBC AUTO: 92.5 FL — SIGNIFICANT CHANGE UP (ref 80–100)
NRBC # BLD: 0 /100 WBCS — SIGNIFICANT CHANGE UP (ref 0–0)
PLATELET # BLD AUTO: 226 K/UL — SIGNIFICANT CHANGE UP (ref 150–400)
POTASSIUM SERPL-MCNC: 4.3 MMOL/L — SIGNIFICANT CHANGE UP (ref 3.5–5.3)
POTASSIUM SERPL-SCNC: 4.3 MMOL/L — SIGNIFICANT CHANGE UP (ref 3.5–5.3)
RBC # BLD: 5.31 M/UL — SIGNIFICANT CHANGE UP (ref 4.2–5.8)
RBC # FLD: 12.6 % — SIGNIFICANT CHANGE UP (ref 10.3–14.5)
SODIUM SERPL-SCNC: 137 MMOL/L — SIGNIFICANT CHANGE UP (ref 135–145)
TOTAL CHOLESTEROL/HDL RATIO MEASUREMENT: 4.8 RATIO — SIGNIFICANT CHANGE UP (ref 3.4–9.6)
TRIGL SERPL-MCNC: 239 MG/DL — HIGH (ref 10–149)
WBC # BLD: 10.52 K/UL — HIGH (ref 3.8–10.5)
WBC # FLD AUTO: 10.52 K/UL — HIGH (ref 3.8–10.5)

## 2020-02-07 PROCEDURE — 85027 COMPLETE CBC AUTOMATED: CPT

## 2020-02-07 PROCEDURE — 99153 MOD SED SAME PHYS/QHP EA: CPT

## 2020-02-07 PROCEDURE — 85730 THROMBOPLASTIN TIME PARTIAL: CPT

## 2020-02-07 PROCEDURE — 86901 BLOOD TYPING SEROLOGIC RH(D): CPT

## 2020-02-07 PROCEDURE — C1894: CPT

## 2020-02-07 PROCEDURE — 71045 X-RAY EXAM CHEST 1 VIEW: CPT

## 2020-02-07 PROCEDURE — C1874: CPT

## 2020-02-07 PROCEDURE — 93454 CORONARY ARTERY ANGIO S&I: CPT | Mod: 59

## 2020-02-07 PROCEDURE — C9606: CPT | Mod: LC

## 2020-02-07 PROCEDURE — C1725: CPT

## 2020-02-07 PROCEDURE — 99232 SBSQ HOSP IP/OBS MODERATE 35: CPT

## 2020-02-07 PROCEDURE — 93306 TTE W/DOPPLER COMPLETE: CPT

## 2020-02-07 PROCEDURE — 99291 CRITICAL CARE FIRST HOUR: CPT | Mod: 25

## 2020-02-07 PROCEDURE — 86900 BLOOD TYPING SEROLOGIC ABO: CPT

## 2020-02-07 PROCEDURE — 86850 RBC ANTIBODY SCREEN: CPT

## 2020-02-07 PROCEDURE — 93306 TTE W/DOPPLER COMPLETE: CPT | Mod: 26

## 2020-02-07 PROCEDURE — 80061 LIPID PANEL: CPT

## 2020-02-07 PROCEDURE — 80048 BASIC METABOLIC PNL TOTAL CA: CPT

## 2020-02-07 PROCEDURE — 84484 ASSAY OF TROPONIN QUANT: CPT

## 2020-02-07 PROCEDURE — C1769: CPT

## 2020-02-07 PROCEDURE — 99152 MOD SED SAME PHYS/QHP 5/>YRS: CPT

## 2020-02-07 PROCEDURE — 83880 ASSAY OF NATRIURETIC PEPTIDE: CPT

## 2020-02-07 PROCEDURE — 93005 ELECTROCARDIOGRAM TRACING: CPT

## 2020-02-07 PROCEDURE — 85610 PROTHROMBIN TIME: CPT

## 2020-02-07 PROCEDURE — 80053 COMPREHEN METABOLIC PANEL: CPT

## 2020-02-07 PROCEDURE — C1887: CPT

## 2020-02-07 RX ORDER — LOSARTAN POTASSIUM 100 MG/1
1 TABLET, FILM COATED ORAL
Qty: 0 | Refills: 0 | DISCHARGE
Start: 2020-02-07

## 2020-02-07 RX ORDER — ATORVASTATIN CALCIUM 80 MG/1
80 TABLET, FILM COATED ORAL AT BEDTIME
Refills: 0 | Status: DISCONTINUED | OUTPATIENT
Start: 2020-02-07 | End: 2020-02-07

## 2020-02-07 RX ORDER — TICAGRELOR 90 MG/1
1 TABLET ORAL
Qty: 0 | Refills: 0 | DISCHARGE
Start: 2020-02-07

## 2020-02-07 RX ORDER — ATORVASTATIN CALCIUM 80 MG/1
1 TABLET, FILM COATED ORAL
Qty: 30 | Refills: 0 | DISCHARGE
Start: 2020-02-07 | End: 2020-03-07

## 2020-02-07 RX ORDER — TICAGRELOR 90 MG/1
1 TABLET ORAL
Qty: 180 | Refills: 3
Start: 2020-02-07 | End: 2021-01-31

## 2020-02-07 RX ORDER — OMEPRAZOLE 10 MG/1
1 CAPSULE, DELAYED RELEASE ORAL
Qty: 0 | Refills: 0 | DISCHARGE

## 2020-02-07 RX ORDER — APIXABAN 2.5 MG/1
1 TABLET, FILM COATED ORAL
Qty: 0 | Refills: 0 | DISCHARGE
Start: 2020-02-07

## 2020-02-07 RX ORDER — LOSARTAN POTASSIUM 100 MG/1
1 TABLET, FILM COATED ORAL
Qty: 0 | Refills: 0 | DISCHARGE

## 2020-02-07 RX ORDER — METOPROLOL TARTRATE 50 MG
1 TABLET ORAL
Qty: 30 | Refills: 3
Start: 2020-02-07 | End: 2020-06-05

## 2020-02-07 RX ORDER — APIXABAN 2.5 MG/1
5 TABLET, FILM COATED ORAL EVERY 12 HOURS
Refills: 0 | Status: DISCONTINUED | OUTPATIENT
Start: 2020-02-07 | End: 2020-02-07

## 2020-02-07 RX ORDER — APIXABAN 2.5 MG/1
1 TABLET, FILM COATED ORAL
Qty: 60 | Refills: 0
Start: 2020-02-07 | End: 2020-03-07

## 2020-02-07 RX ORDER — PANTOPRAZOLE SODIUM 20 MG/1
1 TABLET, DELAYED RELEASE ORAL
Qty: 30 | Refills: 2
Start: 2020-02-07 | End: 2020-05-06

## 2020-02-07 RX ORDER — PANTOPRAZOLE SODIUM 20 MG/1
1 TABLET, DELAYED RELEASE ORAL
Qty: 0 | Refills: 0 | DISCHARGE
Start: 2020-02-07

## 2020-02-07 RX ORDER — METOPROLOL TARTRATE 50 MG
1 TABLET ORAL
Qty: 0 | Refills: 0 | DISCHARGE
Start: 2020-02-07

## 2020-02-07 RX ORDER — SIMVASTATIN 20 MG/1
1 TABLET, FILM COATED ORAL
Qty: 0 | Refills: 0 | DISCHARGE

## 2020-02-07 RX ORDER — ASPIRIN/CALCIUM CARB/MAGNESIUM 324 MG
1 TABLET ORAL
Qty: 0 | Refills: 0 | DISCHARGE
Start: 2020-02-07

## 2020-02-07 RX ORDER — METOPROLOL TARTRATE 50 MG
1 TABLET ORAL
Qty: 0 | Refills: 0 | DISCHARGE

## 2020-02-07 RX ORDER — ATORVASTATIN CALCIUM 80 MG/1
1 TABLET, FILM COATED ORAL
Qty: 30 | Refills: 0
Start: 2020-02-07 | End: 2020-03-07

## 2020-02-07 RX ADMIN — Medication 81 MILLIGRAM(S): at 05:30

## 2020-02-07 RX ADMIN — LOSARTAN POTASSIUM 50 MILLIGRAM(S): 100 TABLET, FILM COATED ORAL at 05:30

## 2020-02-07 RX ADMIN — TICAGRELOR 90 MILLIGRAM(S): 90 TABLET ORAL at 17:06

## 2020-02-07 RX ADMIN — TICAGRELOR 90 MILLIGRAM(S): 90 TABLET ORAL at 05:30

## 2020-02-07 RX ADMIN — APIXABAN 5 MILLIGRAM(S): 2.5 TABLET, FILM COATED ORAL at 15:25

## 2020-02-07 RX ADMIN — PANTOPRAZOLE SODIUM 40 MILLIGRAM(S): 20 TABLET, DELAYED RELEASE ORAL at 05:30

## 2020-02-07 RX ADMIN — Medication 25 MILLIGRAM(S): at 05:31

## 2020-02-07 NOTE — CHART NOTE - NSCHARTNOTEFT_GEN_A_CORE
CAD/NSTEMI  -S/p BEATRIZ to LCx  -C/w ASA/ticagrelor  -C/w statin  -C/w metop  -TTE perfomed and WNL ( see report below)   Pt states had a GIB ~5Y ago and a/c held ever since however had recent endoscopy/ colonoscopy 11/2019 ( see report in paper chart ) and cleared by GI Dr Feliciano and cleared to receive A/C ( pt has not seen Dr Schroeder since 2017)   as per conversation with  Dr Meneses,  johnny to start Eliquis 5mg po BID for Afib, pt will be on triple therapy ( Aspirin, Brilinta and Eliquis) for 1 month then pt will stop Aspirin and cont Brilinta and Eliquis   pt aware needs to make appointment with cardiologist ( Dr Schroeder) and follow up in 1-2 weeks   johnny to be DC home at this time as per both Dr Baxter and Dr Trevino CAD/NSTEMI  -S/p BEATRIZ to LCx  -C/w ASA/ticagrelor  -C/w statin  -C/w metop  -TTE perfomed and WNL ( see report below)   Pt states had a GIB ~5Y ago and a/c held ever since however had recent endoscopy/ colonoscopy 11/2019 ( see report in paper chart ) and cleared by GI Dr Feliciano and cleared to receive A/C ( pt has not seen Dr Schroeder since 2017)   as per conversation with  johnny Buckley to start Eliquis 5mg po BID for Afib, pt will be on triple therapy ( Aspirin, Brilinta and Eliquis) for 1 month then pt will stop Aspirin and cont Brilinta and Eliquis   pt aware needs to make appointment with cardiologist ( Dr Schroeder) and follow up in 1-2 weeks   johnny to be DC home at this time as per both Dr Baxter and Dr Trevino      < from: Transthoracic Echocardiogram (02.07.20 @ 10:12) >    EF (Visual Estimate): 50 %  Doppler Peak Velocity (m/sec): AoV=1.0  ------------------------------------------------------------------------  Observations:  Mitral Valve: Normal mitral valve. Mild mitral  regurgitation.  Aortic Valve/Aorta: Calcified trileaflet aortic valve with  normal opening. Peak transaortic valve gradient equals 4 mm  Hg. Mild aortic regurgitation.  Peak left ventricular  outflow tract gradient equals 3 mm Hg, LVOT velocity time  integral equals 15 cm.  Aortic Root: 4.2 cm.  Left Atrium: Normal left atrium.  Left Ventricle: Endocardium not well visualized;  hypokinesis of the proximal inferolateral wall. Normal left  ventricular internal dimensions and wall thicknesses.  Right Heart: Normal right atrium. Normal right ventricular  size and function. Normal tricuspid valve. Normal pulmonic  valve.  Pericardium/Pleura: Normal pericardium with no pericardial  effusion.  Hemodynamic: Estimated right atrial pressure is 8 mm Hg.  ------------------------------------------------------------------------  Conclusions:  1. Normal left ventricular internal dimensions and wall  thicknesses.  2. Endocardium not well visualized; hypokinesis of the  proximal inferolateral wall.    < end of copied text >

## 2020-02-07 NOTE — PROGRESS NOTE ADULT - SUBJECTIVE AND OBJECTIVE BOX
SUBJECTIVE / OVERNIGHT EVENTS: pt denies chest pain,sob       MEDICATIONS  (STANDING):  apixaban 5 milliGRAM(s) Oral every 12 hours  aspirin  chewable 81 milliGRAM(s) Oral daily  atorvastatin 80 milliGRAM(s) Oral at bedtime  losartan 50 milliGRAM(s) Oral daily  metoprolol succinate ER 25 milliGRAM(s) Oral daily  pantoprazole    Tablet 40 milliGRAM(s) Oral before breakfast  ticagrelor 90 milliGRAM(s) Oral every 12 hours    MEDICATIONS  (PRN):    Vital Signs Last 24 Hrs  T(C): 37.2 (07 Feb 2020 17:35), Max: 37.2 (07 Feb 2020 13:51)  T(F): 98.9 (07 Feb 2020 17:35), Max: 98.9 (07 Feb 2020 13:51)  HR: 79 (07 Feb 2020 17:35) (74 - 79)  BP: 122/82 (07 Feb 2020 17:35) (110/76 - 122/82)  BP(mean): --  RR: 18 (07 Feb 2020 17:35) (17 - 18)  SpO2: 97% (07 Feb 2020 17:35) (95% - 97%)    CAPILLARY BLOOD GLUCOSE        I&O's Summary    06 Feb 2020 07:01  -  07 Feb 2020 07:00  --------------------------------------------------------  IN: 360 mL / OUT: 500 mL / NET: -140 mL    07 Feb 2020 07:01  -  07 Feb 2020 21:38  --------------------------------------------------------  IN: 760 mL / OUT: 0 mL / NET: 760 mL        Constitutional: No fever, fatigue  Skin: No rash.  Eyes: No recent vision problems or eye pain.  ENT: No congestion, ear pain, or sore throat.  Cardiovascular: No chest pain or palpation.  Respiratory: No cough, shortness of breath, congestion, or wheezing.  Gastrointestinal: No abdominal pain, nausea, vomiting, or diarrhea.  Genitourinary: No dysuria.  Musculoskeletal: No joint swelling.  Neurologic: No headache.    PHYSICAL EXAM:  GENERAL: NAD  EYES: EOMI, PERRLA  NECK: Supple, No JVD  CHEST/LUNG: cta fahad  HEART:  S1 , S2 +  ABDOMEN: soft , bs+  EXTREMITIES: no edema   NEUROLOGY:alert awake oriented      LABS:                        16.0   10.52 )-----------( 226      ( 07 Feb 2020 00:51 )             49.1     02-07    137  |  101  |  14  ----------------------------<  111<H>  4.3   |  24  |  1.19    Ca    9.2      07 Feb 2020 00:51    TPro  6.9  /  Alb  3.9  /  TBili  1.3<H>  /  DBili  x   /  AST  88<H>  /  ALT  26  /  AlkPhos  75  02-06    PT/INR - ( 06 Feb 2020 09:08 )   PT: 13.2 sec;   INR: 1.15 ratio         PTT - ( 07 Feb 2020 00:51 )  PTT:31.8 sec          RADIOLOGY & ADDITIONAL TESTS:    Imaging Personally Reviewed:    Consultant(s) Notes Reviewed:      Care Discussed with Consultants/Other Providers:

## 2020-02-07 NOTE — DISCHARGE NOTE NURSING/CASE MANAGEMENT/SOCIAL WORK - PATIENT PORTAL LINK FT
You can access the FollowMyHealth Patient Portal offered by [facility] by registering at the following website: [email or web address]. By joining [facility]’s FollowMyHealth portal, you will also be able to view your health information using other applications (apps) compatible with our system.

## 2020-02-07 NOTE — PROGRESS NOTE ADULT - PROBLEM SELECTOR PLAN 1
s/p pci   cont current cardiac meds  as per cards , no need for monitor of troponin, clear for dc   echo

## 2020-02-28 PROBLEM — E78.5 HYPERLIPIDEMIA, UNSPECIFIED: Chronic | Status: ACTIVE | Noted: 2020-02-06

## 2020-02-28 PROBLEM — I10 ESSENTIAL (PRIMARY) HYPERTENSION: Chronic | Status: ACTIVE | Noted: 2020-02-06

## 2020-03-02 ENCOUNTER — RX CHANGE (OUTPATIENT)
Age: 65
End: 2020-03-02

## 2020-03-03 ENCOUNTER — RX CHANGE (OUTPATIENT)
Age: 65
End: 2020-03-03

## 2020-03-10 ENCOUNTER — NON-APPOINTMENT (OUTPATIENT)
Age: 65
End: 2020-03-10

## 2020-03-10 ENCOUNTER — APPOINTMENT (OUTPATIENT)
Dept: ELECTROPHYSIOLOGY | Facility: CLINIC | Age: 65
End: 2020-03-10
Payer: COMMERCIAL

## 2020-03-10 VITALS
BODY MASS INDEX: 31.29 KG/M2 | DIASTOLIC BLOOD PRESSURE: 77 MMHG | HEART RATE: 77 BPM | SYSTOLIC BLOOD PRESSURE: 144 MMHG | HEIGHT: 72 IN | OXYGEN SATURATION: 96 % | WEIGHT: 231 LBS

## 2020-03-10 PROCEDURE — 93000 ELECTROCARDIOGRAM COMPLETE: CPT

## 2020-03-10 PROCEDURE — 99215 OFFICE O/P EST HI 40 MIN: CPT

## 2020-03-10 RX ORDER — SIMVASTATIN 10 MG/1
10 TABLET, FILM COATED ORAL DAILY
Refills: 0 | Status: DISCONTINUED | COMMUNITY
End: 2020-03-10

## 2020-03-10 RX ORDER — METOPROLOL SUCCINATE 25 MG/1
25 TABLET, EXTENDED RELEASE ORAL DAILY
Refills: 0 | Status: ACTIVE | COMMUNITY
Start: 2020-03-10

## 2020-03-10 NOTE — PHYSICAL EXAM
[General Appearance - Well Developed] : well developed [Normal Appearance] : normal appearance [Well Groomed] : well groomed [General Appearance - Well Nourished] : well nourished [No Deformities] : no deformities [General Appearance - In No Acute Distress] : no acute distress [Normal Conjunctiva] : the conjunctiva exhibited no abnormalities [Eyelids - No Xanthelasma] : the eyelids demonstrated no xanthelasmas [Normal Oral Mucosa] : normal oral mucosa [No Oral Pallor] : no oral pallor [No Oral Cyanosis] : no oral cyanosis [Normal Jugular Venous A Waves Present] : normal jugular venous A waves present [Normal Jugular Venous V Waves Present] : normal jugular venous V waves present [No Jugular Venous Henao A Waves] : no jugular venous henao A waves [Respiration, Rhythm And Depth] : normal respiratory rhythm and effort [Exaggerated Use Of Accessory Muscles For Inspiration] : no accessory muscle use [Auscultation Breath Sounds / Voice Sounds] : lungs were clear to auscultation bilaterally [Heart Rate And Rhythm] : heart rate and rhythm were normal [Heart Sounds] : normal S1 and S2 [Murmurs] : no murmurs present [Abdomen Soft] : soft [Abdomen Tenderness] : non-tender [Abdomen Mass (___ Cm)] : no abdominal mass palpated [Abnormal Walk] : normal gait [Gait - Sufficient For Exercise Testing] : the gait was sufficient for exercise testing [Nail Clubbing] : no clubbing of the fingernails [Cyanosis, Localized] : no localized cyanosis [Petechial Hemorrhages (___cm)] : no petechial hemorrhages [Skin Color & Pigmentation] : normal skin color and pigmentation [] : no rash [No Venous Stasis] : no venous stasis [Skin Lesions] : no skin lesions [No Skin Ulcers] : no skin ulcer [No Xanthoma] : no  xanthoma was observed [Oriented To Time, Place, And Person] : oriented to person, place, and time [Affect] : the affect was normal [Mood] : the mood was normal [No Anxiety] : not feeling anxious

## 2020-03-11 NOTE — HISTORY OF PRESENT ILLNESS
[FreeTextEntry1] : Perry Frankel, MD\par Dr Malou Watts\par \par Dear Unruly,\velasquez \velasquez I saw Eloy Jovel on March 19, 2020. As you know he is a 65 y/o [place] [employer] who was referred because of recurrent atrial fibrillation. He went for routine visit with PCP September for treatment of afib and referred him to electrophysiology. Originally had afib detected 2011, cardioverted x3-4 times between 5481-0014. Underwent afib ablation at [facility] in 2012, 2nd ablation at [place] 2015. He had one subsequent cardioversion 2016. Since then he has had intermittent episodes of PAF. Of note he has a history of gastric bleed detected by endoscopy in 2017, had stopped Pradaxa ~6 months prior to that then switched over to Xarelto which he stopped taking 3 years ago. Last stress test 3 years ago, and last ECHO 2018 revealed EF 59%. He feels well and denies palpitations, exercise intolerance, dizziness or fainting. Goes to the gym regularly-4 times a week and karate once a week \par \velasquez Developed MI February 5, 2020 and went to [place] and underwent PCI by Dr. Tapia to the left circumflex 2 stents. EF of 50%. Currently he has not had chest pain, dyspnea, palpitations, lightheadedness, syncope, near syncope or unusual fatigue. \par

## 2020-03-11 NOTE — DISCUSSION/SUMMARY
[FreeTextEntry1] : In summary Eloy Jovel is a 65 y/o [place] [employer] who was referred because of recurrent atrial fibrillation. He went for routine visit with PCP September for treatment of afib and referred him to electrophysiology. Originally had afib detected 2011, cardioverted x3-4 times between 8626-0465. Underwent afib ablation at [facility] in 2012, 2nd ablation at [place] 2015. He had one subsequent cardioversion 2016. Since then he has had intermittent episodes of PAF. Of note he has a history of gastric bleed detected by endoscopy in 2017, had stopped Pradaxa ~6 months prior to that then switched over to Xarelto which he stopped taking 3 years ago. Last stress test 3 years ago, and last ECHO 2018 revealed EF 59%. He feels well and denies palpitations, exercise intolerance, dizziness or fainting. Goes to the gym regularly-4 times a week and karate once a week. Developed MI February 5, 2020 and went to [place] and underwent PCI by Dr. Tapia to the left circumflex 2 stents. EF of 50%. Currently he has not had chest pain, dyspnea, palpitations, lightheadedness, syncope, near syncope or unusual fatigue. I recommended that he undergo a JAMIE DCCV.  Risks, benefits and alternatives discussed.\par \par Sincerely,\par \par Louis Purcell MD

## 2020-06-08 ENCOUNTER — APPOINTMENT (OUTPATIENT)
Dept: DISASTER EMERGENCY | Facility: CLINIC | Age: 65
End: 2020-06-08

## 2020-06-08 LAB — SARS-COV-2 N GENE NPH QL NAA+PROBE: NOT DETECTED

## 2020-06-10 ENCOUNTER — OUTPATIENT (OUTPATIENT)
Dept: OUTPATIENT SERVICES | Facility: HOSPITAL | Age: 65
LOS: 1 days | Discharge: HOME | End: 2020-06-10
Payer: COMMERCIAL

## 2020-06-10 VITALS — WEIGHT: 220.9 LBS | HEIGHT: 72 IN

## 2020-06-10 DIAGNOSIS — Z98.890 OTHER SPECIFIED POSTPROCEDURAL STATES: Chronic | ICD-10-CM

## 2020-06-10 DIAGNOSIS — I48.91 UNSPECIFIED ATRIAL FIBRILLATION: ICD-10-CM

## 2020-06-10 DIAGNOSIS — Z95.5 PRESENCE OF CORONARY ANGIOPLASTY IMPLANT AND GRAFT: Chronic | ICD-10-CM

## 2020-06-10 DIAGNOSIS — K41.20 BILATERAL FEMORAL HERNIA, WITHOUT OBSTRUCTION OR GANGRENE, NOT SPECIFIED AS RECURRENT: Chronic | ICD-10-CM

## 2020-06-10 LAB
ANION GAP SERPL CALC-SCNC: 15 MMO/L — HIGH (ref 7–14)
BUN SERPL-MCNC: 15 MG/DL — SIGNIFICANT CHANGE UP (ref 7–23)
CALCIUM SERPL-MCNC: 9.6 MG/DL — SIGNIFICANT CHANGE UP (ref 8.4–10.5)
CHLORIDE SERPL-SCNC: 103 MMOL/L — SIGNIFICANT CHANGE UP (ref 98–107)
CO2 SERPL-SCNC: 24 MMOL/L — SIGNIFICANT CHANGE UP (ref 22–31)
CREAT SERPL-MCNC: 1.06 MG/DL — SIGNIFICANT CHANGE UP (ref 0.5–1.3)
GLUCOSE SERPL-MCNC: 97 MG/DL — SIGNIFICANT CHANGE UP (ref 70–99)
HCT VFR BLD CALC: 48.5 % — SIGNIFICANT CHANGE UP (ref 39–50)
HGB BLD-MCNC: 16.5 G/DL — SIGNIFICANT CHANGE UP (ref 13–17)
MAGNESIUM SERPL-MCNC: 2 MG/DL — SIGNIFICANT CHANGE UP (ref 1.6–2.6)
MCHC RBC-ENTMCNC: 31.3 PG — SIGNIFICANT CHANGE UP (ref 27–34)
MCHC RBC-ENTMCNC: 34 % — SIGNIFICANT CHANGE UP (ref 32–36)
MCV RBC AUTO: 92 FL — SIGNIFICANT CHANGE UP (ref 80–100)
NRBC # FLD: 0 K/UL — SIGNIFICANT CHANGE UP (ref 0–0)
PHOSPHATE SERPL-MCNC: 3.2 MG/DL — SIGNIFICANT CHANGE UP (ref 2.5–4.5)
PLATELET # BLD AUTO: 193 K/UL — SIGNIFICANT CHANGE UP (ref 150–400)
PMV BLD: 10.2 FL — SIGNIFICANT CHANGE UP (ref 7–13)
POTASSIUM SERPL-MCNC: 4.1 MMOL/L — SIGNIFICANT CHANGE UP (ref 3.5–5.3)
POTASSIUM SERPL-SCNC: 4.1 MMOL/L — SIGNIFICANT CHANGE UP (ref 3.5–5.3)
RBC # BLD: 5.27 M/UL — SIGNIFICANT CHANGE UP (ref 4.2–5.8)
RBC # FLD: 12.3 % — SIGNIFICANT CHANGE UP (ref 10.3–14.5)
SODIUM SERPL-SCNC: 142 MMOL/L — SIGNIFICANT CHANGE UP (ref 135–145)
WBC # BLD: 7.91 K/UL — SIGNIFICANT CHANGE UP (ref 3.8–10.5)
WBC # FLD AUTO: 7.91 K/UL — SIGNIFICANT CHANGE UP (ref 3.8–10.5)

## 2020-06-10 PROCEDURE — 93010 ELECTROCARDIOGRAM REPORT: CPT | Mod: 76

## 2020-06-10 PROCEDURE — 92960 CARDIOVERSION ELECTRIC EXT: CPT

## 2020-06-10 RX ORDER — APIXABAN 2.5 MG/1
5 TABLET, FILM COATED ORAL ONCE
Refills: 0 | Status: COMPLETED | OUTPATIENT
Start: 2020-06-10 | End: 2020-06-10

## 2020-06-10 RX ORDER — SODIUM CHLORIDE 9 MG/ML
3 INJECTION INTRAMUSCULAR; INTRAVENOUS; SUBCUTANEOUS EVERY 8 HOURS
Refills: 0 | Status: DISCONTINUED | OUTPATIENT
Start: 2020-06-10 | End: 2020-06-25

## 2020-06-10 RX ORDER — TICAGRELOR 90 MG/1
90 TABLET ORAL ONCE
Refills: 0 | Status: COMPLETED | OUTPATIENT
Start: 2020-06-10 | End: 2020-06-10

## 2020-06-10 RX ADMIN — APIXABAN 5 MILLIGRAM(S): 2.5 TABLET, FILM COATED ORAL at 13:47

## 2020-06-10 RX ADMIN — TICAGRELOR 90 MILLIGRAM(S): 90 TABLET ORAL at 13:47

## 2020-06-10 NOTE — H&P CARDIOLOGY - NEUROLOGICAL DETAILS
sensation intact/responds to pain/responds to verbal commands/alert and oriented x 3/cranial nerves intact/normal strength

## 2020-06-10 NOTE — H&P CARDIOLOGY - NEGATIVE CARDIOVASCULAR SYMPTOMS
no palpitations/no paroxysmal nocturnal dyspnea/no claudication/no dyspnea on exertion/no peripheral edema/no orthopnea/no chest pain

## 2020-06-10 NOTE — H&P CARDIOLOGY - NEGATIVE NEUROLOGICAL SYMPTOMS
no confusion/no facial palsy/no vertigo/no paresthesias/no generalized seizures/no loss of consciousness/no hemiparesis/no transient paralysis/no syncope/no tremors/no loss of sensation/no difficulty walking/no weakness/no focal seizures/no headache

## 2020-06-10 NOTE — H&P CARDIOLOGY - HISTORY OF PRESENT ILLNESS
63 y/o male with a PMHx of PAF on Eliquis (last dose 6/9 at 8:30PM) s/p multiple cardioversions (last one 2016) and s/p two ablations (last one in 2015), CAD s/p BEATRIZ x 2 to distal circumflex in February 2020 (last dose Brilinta 6/9 at 8:30PM), HTN and HLD presents for elective cardioversion. Pt was diagnosed with atrial fibrillation in 2011, was cardioverted 3-4 times between 1258-1369 with a subsequent atrial fibrillation ablation at [facility] in 2012, a second ablation at [facility] in 2015 and another cardioversion in 2016. Since then he has been relatively well controlled though he does have intermittent episodes of atrial fibrillation for which he remains asymptomatic. Pt had a GI bleed in 2017 for which he stopped Pradaxa and then switched over to Xarelto. Pt then had a myocardial infarction in February 2020 and has been on Brilinta and Eliquis since then. Pt is physically fit, works out regularly and admits to feeling well. Pt denies COVID infection/exposure, fever, chills, recent travel, headache, dizziness, visual deficits, chest pain, shortness of breath, orthopnea, palpitations, abdominal pain, N/V/D/C, hematochezia, melena, dysuria, hematuria, LOC, syncope, peripheral edema. Pt now presents for elective cardioversion. Pt is compliant with his Eliquis and denies missing any doses since his heart attack in February; he was told not to take his Eliquis this morning when he scheduled this procedure, so he will get a dose now. 63 y/o male with a PMHx of PAF on Eliquis (last dose 6/9 at 8:30PM) s/p multiple cardioversions (last one 2016) and s/p two ablations (last one in 2015), CAD s/p BEATRIZ x 2 to distal circumflex in February 2020 (last dose Brilinta 6/9 at 8:30PM), HTN and HLD presents for elective cardioversion. Pt was diagnosed with atrial fibrillation in 2011, was cardioverted 3-4 times between 9546-2320 with a subsequent atrial fibrillation ablation at [facility] in 2012, a second ablation at [facility] in 2015 and another cardioversion in 2016. Since then he has been relatively well controlled though he does have intermittent episodes of atrial fibrillation for which he remains asymptomatic. Pt had a GI bleed in 2017 for which he stopped Pradaxa and then switched over to Xarelto. Pt then had a myocardial infarction in February 2020 and has been on Brilinta and Eliquis since then. Pt is physically fit, works out regularly and admits to feeling well. Pt denies COVID infection/exposure, fever, chills, recent travel, headache, dizziness, visual deficits, chest pain, shortness of breath, orthopnea, palpitations, abdominal pain, N/V/D/C, hematochezia, melena, dysuria, hematuria, LOC, syncope, peripheral edema. Pt now presents for elective cardioversion. Pt is compliant with his Eliquis and denies missing any doses since his heart attack in February; he was told not to take his Eliquis this morning when he scheduled this procedure, so he will get a dose now.    February 2020 - Echo: EF 50%. Normal left ventricular internal dimensions and wall thicknesses. Endocardium not well visualized; hypokinesis of the proximal inferolateral wall.    February 2020 - Cardiac cath: CORONARY VESSELS: The coronary circulation is right dominant.  LM: Angiography showed mild atherosclerosis with no flow limiting lesions.  Proximal LAD: There was a 20% stenosis.  D1: There was a 40% stenosis.  Distal circumflex: There was a diffuse 99% stenosis.  OM3: There was a 90% stenosis.  Proximal RCA: Angiography showed mild to moderate diffuse atherosclerosis with no flow limiting lesions. There was a 20% stenosis.  COMPLICATIONS: There were no complications.  DIAGNOSTIC RECOMMENDATIONS: Successful PCI to the distal LCx. Continue DAPT. Aggressive medical management and risk factor modification.

## 2020-06-10 NOTE — H&P CARDIOLOGY - RS GEN PE MLT RESP DETAILS PC
respirations non-labored/clear to auscultation bilaterally/breath sounds equal/no chest wall tenderness/good air movement/no intercostal retractions/no rales/airway patent/no rhonchi/no wheezes

## 2020-06-10 NOTE — H&P CARDIOLOGY - PSH
H/O hand surgery  Left hand  History of cardioversion    History of nasal surgery    S/P ablation of atrial fibrillation    S/P coronary artery stent placement    S/P inguinal hernia repair  Bilateral

## 2020-06-10 NOTE — H&P CARDIOLOGY - PMH
CAD (coronary artery disease)  s/p stent placement  GIB (gastrointestinal bleeding)    HLD (hyperlipidemia)    HTN (hypertension)    PAF (paroxysmal atrial fibrillation)  On Eliquis

## 2020-06-26 ENCOUNTER — APPOINTMENT (OUTPATIENT)
Dept: ELECTROPHYSIOLOGY | Facility: CLINIC | Age: 65
End: 2020-06-26
Payer: COMMERCIAL

## 2020-06-26 PROCEDURE — 99441: CPT

## 2020-06-27 NOTE — PLAN
[FreeTextEntry1] : Eloy Jovel on June 27, 2020. As you know he is a 63 y/o [employer] who was referred because of recurrent atrial fibrillation. He went for routine visit with PCP September for treatment of afib and referred him to electrophysiology. Originally had afib detected 2011, cardioverted x3-4 times between 5177-3942. Underwent afib ablation at [facility] in 2012, 2nd ablation at [place] 2015. He had one subsequent cardioversion 2016. Since then he has had intermittent episodes of PAF. Of note he has a history of gastric bleed detected by endoscopy in 2017, had stopped Pradaxa ~6 months prior to that then switched over to Xarelto. Last stress test 3 years ago, and last ECHO 2018 revealed EF 59%. Developed MI February 5, 2020 and went to [place] and underwent PCI by Dr. Tapia to the left circumflex 2 stents. EF of 50%.Patient was cardioverted Maritza 10, 2020. He has not had chest pain, dyspnea, palpitations, lightheadedness, syncope, near syncope or unusual fatigue since the cardioversion. Sometimes when he takes medication he gets dyspnea. He walks 2.5 miles, works out and rides bicycle.  He has lost 22 pounds since heart attack February 5, 2020 at [facility]. Metoprolol causes fatigue and patient does not always take it because of concern of blood pressure and heart rate. BP: 120/80.  Heart 89 yesterday. He will followup as needed and continue Eliquis. \par \par Sincerely,\par \par Louis Purcell MD

## 2020-06-27 NOTE — HISTORY OF PRESENT ILLNESS
[Home] : at home, [unfilled] , at the time of the visit. [Medical Office: ([facility])___] : at the medical office located in  [Verbal consent obtained from patient] : the patient, [unfilled] [Time Spent: ___ minutes] : I have spent [unfilled] minutes with the patient on the telephone [FreeTextEntry1] : Perry Frankel, MD\par Dr Malou Watts\par \par Dear Unruly,\par \par I saw Eloy Jovel on June 27, 2020. As you know he is a 65 y/o [employer] who was referred because of recurrent atrial fibrillation. He went for routine visit with PCP September for treatment of afib and referred him to electrophysiology. Originally had afib detected 2011, cardioverted x3-4 times between 7662-2063. Underwent afib ablation at [facility] in 2012, 2nd ablation at [place] 2015. He had one subsequent cardioversion 2016. Since then he has had intermittent episodes of PAF. Of note he has a history of gastric bleed detected by endoscopy in 2017, had stopped Pradaxa ~6 months prior to that then switched over to Xarelto. Last stress test 3 years ago, and last ECHO 2018 revealed EF 59%. Developed MI February 5, 2020 and went to [place] and underwent PCI by Dr. Tapia to the left circumflex 2 stents. EF of 50%.Patient was cardioverted Maritza 10, 2020. He has not had chest pain, dyspnea, palpitations, lightheadedness, syncope, near syncope or unusual fatigue since the cardioversion. Sometimes when he takes medication he gets dyspnea. He walks 2.5 miles, works out and rides bicycle.  He has lost 22 pounds since heart attack February 5, 2020 at [facility]. Metoprolol causes fatigue and patient does not always take it because of concern of blood pressure and heart rate. BP: 120/80.  Heart 89 yesterday.

## 2021-02-04 PROBLEM — I48.0 PAROXYSMAL ATRIAL FIBRILLATION: Chronic | Status: ACTIVE | Noted: 2020-06-10

## 2021-02-05 ENCOUNTER — APPOINTMENT (OUTPATIENT)
Dept: ELECTROPHYSIOLOGY | Facility: CLINIC | Age: 66
End: 2021-02-05
Payer: COMMERCIAL

## 2021-02-05 ENCOUNTER — NON-APPOINTMENT (OUTPATIENT)
Age: 66
End: 2021-02-05

## 2021-02-05 VITALS
DIASTOLIC BLOOD PRESSURE: 92 MMHG | HEART RATE: 74 BPM | BODY MASS INDEX: 31.87 KG/M2 | OXYGEN SATURATION: 98 % | HEIGHT: 72 IN | SYSTOLIC BLOOD PRESSURE: 146 MMHG

## 2021-02-05 VITALS — WEIGHT: 235 LBS | BODY MASS INDEX: 31.87 KG/M2

## 2021-02-05 VITALS — TEMPERATURE: 98 F | DIASTOLIC BLOOD PRESSURE: 70 MMHG | SYSTOLIC BLOOD PRESSURE: 120 MMHG

## 2021-02-05 PROCEDURE — 93000 ELECTROCARDIOGRAM COMPLETE: CPT

## 2021-02-05 PROCEDURE — 99215 OFFICE O/P EST HI 40 MIN: CPT

## 2021-02-05 PROCEDURE — 99072 ADDL SUPL MATRL&STAF TM PHE: CPT

## 2021-02-05 RX ORDER — ROSUVASTATIN CALCIUM 40 MG/1
40 TABLET, FILM COATED ORAL
Qty: 90 | Refills: 3 | Status: ACTIVE | COMMUNITY
Start: 2021-02-05

## 2021-02-05 RX ORDER — ATORVASTATIN CALCIUM 80 MG/1
80 TABLET, FILM COATED ORAL
Qty: 30 | Refills: 0 | Status: DISCONTINUED | COMMUNITY
Start: 2020-03-10 | End: 2021-02-05

## 2021-02-05 RX ORDER — TICAGRELOR 90 MG/1
90 TABLET ORAL TWICE DAILY
Qty: 60 | Refills: 1 | Status: DISCONTINUED | COMMUNITY
Start: 2020-03-10 | End: 2021-02-05

## 2021-02-05 NOTE — DISCUSSION/SUMMARY
[FreeTextEntry1] : Eloy Jovel is a 66 y/o man with Hx of HTN, HLD, CAD/MI s/p MI and PCI (2/2020) and recurrent paroxysmal afib s/p mulitple DCCV (4672-5612) and ablation x2 (2012 and 2015) and now with most recent DCCV 6/10/2020 who presents today for routine f/u. \par \par Impression:\par \par 1. Paroxysmal afib/aflutter: EKG today aflutter. Discussed treatment options for afib/aflutter including rate control vs antiarrhythmics vs possible ablation. Given recurrent symptomatic aflutter/afib despite rate control management and young age/preference to avoid antiarrhythmics, recommend undergoing possible afib ablation. Prior echo, cardioversion reports including Dr. Frankel's records reviewed. Risks, benefits, and alternatives to procedure discussed at length. Risks including that of bleeding, infection, stroke, and cardiac tamponade discussed and he verbalizes understanding of all.\par \par 2. HTN: resume oral antihypertensives as prescribed. Encouraged heart healthy diet, sodium restriction, and weight loss. Continue regular f/u with Cardiologist for further HTN management.\par \par 3. HLD: resume statin therapy as prescribed and regular f/u with Cardiologist for routine lipid monitoring and management.\par \par Plan for afib/aflutter ablation. \par \par Sincerely,\par \par Louis Purcell MD

## 2021-02-05 NOTE — PHYSICAL EXAM
[General Appearance - Well Developed] : well developed [Normal Appearance] : normal appearance [Well Groomed] : well groomed [General Appearance - Well Nourished] : well nourished [No Deformities] : no deformities [General Appearance - In No Acute Distress] : no acute distress [Normal Conjunctiva] : the conjunctiva exhibited no abnormalities [Eyelids - No Xanthelasma] : the eyelids demonstrated no xanthelasmas [Normal Oral Mucosa] : normal oral mucosa [No Oral Pallor] : no oral pallor [No Oral Cyanosis] : no oral cyanosis [Normal Jugular Venous A Waves Present] : normal jugular venous A waves present [Normal Jugular Venous V Waves Present] : normal jugular venous V waves present [No Jugular Venous Henao A Waves] : no jugular venous henao A waves [Respiration, Rhythm And Depth] : normal respiratory rhythm and effort [Exaggerated Use Of Accessory Muscles For Inspiration] : no accessory muscle use [Auscultation Breath Sounds / Voice Sounds] : lungs were clear to auscultation bilaterally [Heart Rate And Rhythm] : heart rate and rhythm were normal [Heart Sounds] : normal S1 and S2 [Murmurs] : no murmurs present [Abdomen Tenderness] : non-tender [Abdomen Soft] : soft [Abdomen Mass (___ Cm)] : no abdominal mass palpated [Abnormal Walk] : normal gait [Gait - Sufficient For Exercise Testing] : the gait was sufficient for exercise testing [Nail Clubbing] : no clubbing of the fingernails [Cyanosis, Localized] : no localized cyanosis [Petechial Hemorrhages (___cm)] : no petechial hemorrhages [Skin Color & Pigmentation] : normal skin color and pigmentation [] : no rash [No Venous Stasis] : no venous stasis [Skin Lesions] : no skin lesions [No Skin Ulcers] : no skin ulcer [No Xanthoma] : no  xanthoma was observed [Oriented To Time, Place, And Person] : oriented to person, place, and time [Affect] : the affect was normal [Mood] : the mood was normal [No Anxiety] : not feeling anxious

## 2021-02-05 NOTE — HISTORY OF PRESENT ILLNESS
[FreeTextEntry1] : Perry Frankel, MD\par Dr Malou Watts\par \par Eloy Jovel is a 66 y/o man with Hx of HTN, HLD, CAD/MI s/p MI and PCI (2/2020) and recurrent paroxysmal afib s/p mulitple DCCV (3064-6192) and ablation x2 (2012 and 2015) and now with most recent DCCV 6/10/2020 who presents today for routine f/u. Had been feeling well until yesterday when checking his BP his monitor read afib. Was feeling extra fatigue. Saw his MD and EKG revealed atrial flutter. Reinitiated Eliquis and metoprolol. Denies chest pain, palpitations, SOB, syncope or near syncope.\par

## 2021-03-11 ENCOUNTER — OUTPATIENT (OUTPATIENT)
Dept: OUTPATIENT SERVICES | Facility: HOSPITAL | Age: 66
LOS: 1 days | End: 2021-03-11

## 2021-03-11 VITALS
SYSTOLIC BLOOD PRESSURE: 142 MMHG | TEMPERATURE: 97 F | HEIGHT: 73 IN | OXYGEN SATURATION: 98 % | HEART RATE: 92 BPM | RESPIRATION RATE: 16 BRPM | DIASTOLIC BLOOD PRESSURE: 88 MMHG | WEIGHT: 229.94 LBS

## 2021-03-11 DIAGNOSIS — Z98.890 OTHER SPECIFIED POSTPROCEDURAL STATES: Chronic | ICD-10-CM

## 2021-03-11 DIAGNOSIS — I48.92 UNSPECIFIED ATRIAL FLUTTER: ICD-10-CM

## 2021-03-11 DIAGNOSIS — Z95.5 PRESENCE OF CORONARY ANGIOPLASTY IMPLANT AND GRAFT: Chronic | ICD-10-CM

## 2021-03-11 DIAGNOSIS — T78.40XA ALLERGY, UNSPECIFIED, INITIAL ENCOUNTER: ICD-10-CM

## 2021-03-11 LAB
ALBUMIN SERPL ELPH-MCNC: 4.4 G/DL — SIGNIFICANT CHANGE UP (ref 3.3–5)
ALP SERPL-CCNC: 97 U/L — SIGNIFICANT CHANGE UP (ref 40–120)
ALT FLD-CCNC: 21 U/L — SIGNIFICANT CHANGE UP (ref 4–41)
ANION GAP SERPL CALC-SCNC: 12 MMOL/L — SIGNIFICANT CHANGE UP (ref 7–14)
AST SERPL-CCNC: 28 U/L — SIGNIFICANT CHANGE UP (ref 4–40)
BILIRUB SERPL-MCNC: 1.2 MG/DL — SIGNIFICANT CHANGE UP (ref 0.2–1.2)
BLD GP AB SCN SERPL QL: NEGATIVE — SIGNIFICANT CHANGE UP
BUN SERPL-MCNC: 19 MG/DL — SIGNIFICANT CHANGE UP (ref 7–23)
CALCIUM SERPL-MCNC: 9.7 MG/DL — SIGNIFICANT CHANGE UP (ref 8.4–10.5)
CHLORIDE SERPL-SCNC: 101 MMOL/L — SIGNIFICANT CHANGE UP (ref 98–107)
CO2 SERPL-SCNC: 27 MMOL/L — SIGNIFICANT CHANGE UP (ref 22–31)
CREAT SERPL-MCNC: 1.02 MG/DL — SIGNIFICANT CHANGE UP (ref 0.5–1.3)
GLUCOSE SERPL-MCNC: 94 MG/DL — SIGNIFICANT CHANGE UP (ref 70–99)
HCT VFR BLD CALC: 45.2 % — SIGNIFICANT CHANGE UP (ref 39–50)
HGB BLD-MCNC: 15 G/DL — SIGNIFICANT CHANGE UP (ref 13–17)
MCHC RBC-ENTMCNC: 29.8 PG — SIGNIFICANT CHANGE UP (ref 27–34)
MCHC RBC-ENTMCNC: 33.2 GM/DL — SIGNIFICANT CHANGE UP (ref 32–36)
MCV RBC AUTO: 89.9 FL — SIGNIFICANT CHANGE UP (ref 80–100)
NRBC # BLD: 0 /100 WBCS — SIGNIFICANT CHANGE UP
NRBC # FLD: 0 K/UL — SIGNIFICANT CHANGE UP
PLATELET # BLD AUTO: 188 K/UL — SIGNIFICANT CHANGE UP (ref 150–400)
POTASSIUM SERPL-MCNC: 4 MMOL/L — SIGNIFICANT CHANGE UP (ref 3.5–5.3)
POTASSIUM SERPL-SCNC: 4 MMOL/L — SIGNIFICANT CHANGE UP (ref 3.5–5.3)
PROT SERPL-MCNC: 7.7 G/DL — SIGNIFICANT CHANGE UP (ref 6–8.3)
RBC # BLD: 5.03 M/UL — SIGNIFICANT CHANGE UP (ref 4.2–5.8)
RBC # FLD: 12.5 % — SIGNIFICANT CHANGE UP (ref 10.3–14.5)
RH IG SCN BLD-IMP: POSITIVE — SIGNIFICANT CHANGE UP
SODIUM SERPL-SCNC: 140 MMOL/L — SIGNIFICANT CHANGE UP (ref 135–145)
WBC # BLD: 8.78 K/UL — SIGNIFICANT CHANGE UP (ref 3.8–10.5)
WBC # FLD AUTO: 8.78 K/UL — SIGNIFICANT CHANGE UP (ref 3.8–10.5)

## 2021-03-11 NOTE — H&P PST ADULT - NSICDXPROBLEM_GEN_ALL_CORE_FT
PROBLEM DIAGNOSES  Problem: Atrial flutter  Assessment and Plan: Patient tentatively scheduled for complex ablation on 3/24/21.  Pre-op instructions provided. Pt given verbal and written instructions with teach back on chlorhexidine shampoo and pepcid. Pt verbalized understanding with return demonstration.   Covid testing scheduled prior to surgery as per patient.   Patient to obtain all medication instructions as per EP. Patient verbalized understanding.   NIKKI precautions, OR booking notified     Problem: Allergy  Assessment and Plan: Patient allergic to PCN. OR booking notified.

## 2021-03-11 NOTE — H&P PST ADULT - HISTORY OF PRESENT ILLNESS
65 year old male presents to Presurgical testing with diagnosis of unspecific atrial flutter scheduled for complex ablation. Patient with h/o Afib s/p ablation x 2 (2012 & 2015). Patient recently feeling extra fatigue. EKG revealed Atrial flutter.

## 2021-03-11 NOTE — H&P PST ADULT - ATTENDING COMMENTS
65 year old male presents to Presurgical testing with diagnosis of unspecific atrial flutter scheduled for complex ablation. Patient with h/o Afib s/p ablation x 2 (2012 & 2015). Patient recently feeling extra fatigue. EKG revealed Atrial flutter.  Patient scheduled for ablation.

## 2021-03-11 NOTE — H&P PST ADULT - NSICDXPASTSURGICALHX_GEN_ALL_CORE_FT
PAST SURGICAL HISTORY:  H/O hand surgery Left hand    History of cardioversion     History of nasal surgery     S/P ablation of atrial fibrillation 2012, 2015    S/P coronary artery stent placement 2/2020    S/P inguinal hernia repair Bilateral

## 2021-03-11 NOTE — H&P PST ADULT - NEGATIVE MUSCULOSKELETAL SYMPTOMS
no arthralgia/no arthritis/no joint swelling/no myalgia/no muscle cramps/no muscle weakness/no stiffness/no neck pain/no back pain

## 2021-03-11 NOTE — H&P PST ADULT - NEGATIVE ENMT SYMPTOMS
no hearing difficulty/no ear pain/no tinnitus/no vertigo/no sinus symptoms/no nasal congestion/no nasal discharge/no throat pain/no dysphagia

## 2021-03-11 NOTE — H&P PST ADULT - NEGATIVE NEUROLOGICAL SYMPTOMS
no transient paralysis/no weakness/no paresthesias/no generalized seizures/no difficulty walking/no headache

## 2021-03-16 PROBLEM — K92.2 GASTROINTESTINAL HEMORRHAGE, UNSPECIFIED: Chronic | Status: ACTIVE | Noted: 2020-06-10

## 2021-03-19 ENCOUNTER — NON-APPOINTMENT (OUTPATIENT)
Age: 66
End: 2021-03-19

## 2021-03-21 ENCOUNTER — APPOINTMENT (OUTPATIENT)
Dept: DISASTER EMERGENCY | Facility: CLINIC | Age: 66
End: 2021-03-21

## 2021-03-22 LAB — SARS-COV-2 N GENE NPH QL NAA+PROBE: NOT DETECTED

## 2021-03-24 ENCOUNTER — OUTPATIENT (OUTPATIENT)
Dept: OUTPATIENT SERVICES | Facility: HOSPITAL | Age: 66
LOS: 1 days | Discharge: HOME | End: 2021-03-24
Payer: COMMERCIAL

## 2021-03-24 DIAGNOSIS — I48.92 UNSPECIFIED ATRIAL FLUTTER: ICD-10-CM

## 2021-03-24 DIAGNOSIS — Z98.890 OTHER SPECIFIED POSTPROCEDURAL STATES: Chronic | ICD-10-CM

## 2021-03-24 DIAGNOSIS — Z95.5 PRESENCE OF CORONARY ANGIOPLASTY IMPLANT AND GRAFT: Chronic | ICD-10-CM

## 2021-03-24 PROCEDURE — 93655 ICAR CATH ABLTJ DSCRT ARRHYT: CPT

## 2021-03-24 PROCEDURE — 93623 PRGRMD STIMJ&PACG IV RX NFS: CPT | Mod: 26

## 2021-03-24 PROCEDURE — 93662 INTRACARDIAC ECG (ICE): CPT | Mod: 26

## 2021-03-24 PROCEDURE — 93010 ELECTROCARDIOGRAM REPORT: CPT | Mod: 76

## 2021-03-24 PROCEDURE — 93613 INTRACARDIAC EPHYS 3D MAPG: CPT

## 2021-03-24 PROCEDURE — 93656 COMPRE EP EVAL ABLTJ ATR FIB: CPT

## 2021-03-24 RX ORDER — UBIDECARENONE 100 MG
1 CAPSULE ORAL
Qty: 0 | Refills: 0 | DISCHARGE

## 2021-03-24 RX ORDER — OMEGA-3 ACID ETHYL ESTERS 1 G
2 CAPSULE ORAL
Qty: 0 | Refills: 0 | DISCHARGE

## 2021-03-24 RX ORDER — APIXABAN 2.5 MG/1
1 TABLET, FILM COATED ORAL
Qty: 0 | Refills: 0 | DISCHARGE

## 2021-03-24 RX ORDER — PANTOPRAZOLE SODIUM 20 MG/1
40 TABLET, DELAYED RELEASE ORAL ONCE
Refills: 0 | Status: DISCONTINUED | OUTPATIENT
Start: 2021-03-24 | End: 2021-04-07

## 2021-03-24 RX ORDER — ROSUVASTATIN CALCIUM 5 MG/1
1 TABLET ORAL
Qty: 0 | Refills: 0 | DISCHARGE

## 2021-03-24 RX ORDER — SODIUM CHLORIDE 9 MG/ML
3 INJECTION INTRAMUSCULAR; INTRAVENOUS; SUBCUTANEOUS EVERY 8 HOURS
Refills: 0 | Status: DISCONTINUED | OUTPATIENT
Start: 2021-03-24 | End: 2021-04-07

## 2021-03-24 RX ORDER — PANTOPRAZOLE SODIUM 20 MG/1
1 TABLET, DELAYED RELEASE ORAL
Qty: 30 | Refills: 0
Start: 2021-03-24 | End: 2021-04-22

## 2021-03-24 NOTE — CHART NOTE - NSCHARTNOTEFT_GEN_A_CORE
ELECTROPHYSIOLOGY        Patient s/p Atrial fibrillation and MA atrial flutter ablation. Tolerated the procedure well. No complications.   Telemetry is Normal sinus rhythm. .  Vital signs stable.   Right groin without bleeding, ecchymosis or hematoma.  Pedal pulses strong and equal.   Post procedure ablation teaching done. Written instructions and contact information provided.   He has a follow-up appointment with Dr. Purcell on Friday 4/9/21 at 1:30pm 4th floor Oncology [facility] [phone].

## 2021-03-24 NOTE — CHART NOTE - NSCHARTNOTEFT_GEN_A_CORE
Type of procedure: PVI + PWI + MA atrial flutter + CTI atrial flutter  Licensed independent practitioner: Louis Purcell MD  Assistant: None  Description of procedure: After informed consent was obtained, the patient was brought to the Electrophysiology laboratory in the postabsorptive state, and was prepped and draped in the usual sterile fashion. The patient was electively intubated by an anesthesiologist, who provided general anesthesia throughout the case. In addition an esophageal temperature probe was placed into the esophagus to allow temperate monitoring during ablation. Under sterile conditions, femoral venous access was obtained and catheters advanced to the right atrium under fluoroscopic guidance without complications.  Heparin 10,000 units followed by 2400 unit/hour drip was administered to maintain an ACT of 300-400 seconds throughout the case. An endocardial shell of the left atrium was created using ICE guidance and the pulmonary veins, left atrial appendage and esophagus were tagged.  Transeptal access was achieved using fluoroscopic and ICE guidance using an 8.5 Vizigo Sheath and C1 [place] needle. The mean left atrial pressure was 22 mm Hg.  A [email or web address] Marc F curve catheter and a 3.5 mm irrigated-tip [employer] ThermoCool ST SF DF-curve ablation catheter were used for mapping and ablation. A FAM and voltage map of the LA were created using a Carto 3D mapping system.  Pacing at 20 ma and 2.0 msec was performed inside and around the anterior portion of the right superior pulmonary vein to exclude phrenic nerve capture and there was none. for the most part the pulmonary veins were isolated except a small portion of the anterior RSPV which was ablated with careful attention to avoid ablation within the pulmonary vein. Next the anterior mitral isthmus was ablated with termination of atrial flutter (patient admitted in MA flutter).  The LA floor required homogenization of low voltage areas as did areas of the anterior LA.  The CTI also required homogenization of scar and line of block was confirmed. Esophageal temperatures alden from a baseline of 36.9 degrees Celsius to a peak of 37.1 degrees Celsius.  Entrance and exit block of the pulmonary veins remained for >30 minutes, without evidence of acute reconnection. An EP study was performed with and without the use of dobutamine 40 mkm and no arrhythmias were induced. The catheters were removed from the left atrium, and heparin was discontinued and reversed with protamine 80 mg. Repeat ICE imaging revealed no significant pericardial effusion. All catheters and sheaths were removed and hemostasis achieved and at least 20 minutes of holding direct pressure to the access sites. The final left atrial pressure was 16 mmHg.  The patient tolerated the procedure well and was successfully extubated and transferred to the recovery in stable condition.  A representative from [employer] was present to help operate a sophisticated mapping system.   Findings of procedure: Successful isolation of all four pulmonary veins, posterior wall, and treatment of MA atrial flutter.  Estimated blood loss: <10 cc  Specimen removed: none  Preoperative Dx: AF and MA atrial flutter  Postoperative Dx: Afib and MA atrial flutter  Complications: None  Anesthesia type: General    Louis Purcell MD

## 2021-03-24 NOTE — CHART NOTE - NSCHARTNOTEFT_GEN_A_CORE
In brief, this is a 63 y/o male with a PMHx of PAF on Eliquis (last dose 3/22 AM) s/p multiple cardioversions (last one June 2020) and s/p two ablations (last one in 2015), CAD s/p BEATRIZ x 2 to distal circumflex in February 2020, HTN and HLD presents for elective atrial fibrillation/flutter ablation. Pt does admit to occasional feeling of his heart skipping and mild fatigue. Pt was seen in the office last month with EKG revealing atrial flutter. Pt now referred for ablation. H&P from PST reviewed. Labs reviewed. Medication reconciliation reviewed and edited where appropriate. Last dose of Eliquis 3/22 AM. Procedure explained, risks/benefits discussed and consent signed and placed in chart.      February 2020 - Echo: EF 50%. Normal left ventricular internal dimensions and wall thicknesses. Endocardium not well visualized; hypokinesis of the proximal inferolateral wall.    February 2020 - Cardiac cath: CORONARY VESSELS: The coronary circulation is right dominant.  LM: Angiography showed mild atherosclerosis with no flow limiting lesions.  Proximal LAD: There was a 20% stenosis.  D1: There was a 40% stenosis.  Distal circumflex: There was a diffuse 99% stenosis.  OM3: There was a 90% stenosis.  Proximal RCA: Angiography showed mild to moderate diffuse atherosclerosis with no flow limiting lesions. There was a 20% stenosis.  COMPLICATIONS: There were no complications.  DIAGNOSTIC RECOMMENDATIONS: Successful PCI to the distal LCx. Continue DAPT. Aggressive medical management and risk factor modification. In brief, this is a 63 y/o male with a PMHx of PAF on Eliquis (last dose 3/22 AM) s/p multiple cardioversions (last one June 2020) and s/p two ablations (last one in 2015), CAD s/p BEATRIZ x 2 to distal circumflex in February 2020, HTN and HLD presents for elective atrial fibrillation/flutter ablation. Pt does admit to occasional feeling of his heart skipping and mild fatigue. Pt was seen in the office last month with EKG revealing atrial flutter. Pt now referred for ablation. H&P from PST reviewed. Labs reviewed. Medication reconciliation reviewed and edited where appropriate. Last dose of Eliquis 3/22 AM. COVID PCR negative 3/21/2021. Procedure explained, risks/benefits discussed and consent signed and placed in chart.      February 2020 - Echo: EF 50%. Normal left ventricular internal dimensions and wall thicknesses. Endocardium not well visualized; hypokinesis of the proximal inferolateral wall.    February 2020 - Cardiac cath: CORONARY VESSELS: The coronary circulation is right dominant.  LM: Angiography showed mild atherosclerosis with no flow limiting lesions.  Proximal LAD: There was a 20% stenosis.  D1: There was a 40% stenosis.  Distal circumflex: There was a diffuse 99% stenosis.  OM3: There was a 90% stenosis.  Proximal RCA: Angiography showed mild to moderate diffuse atherosclerosis with no flow limiting lesions. There was a 20% stenosis.  COMPLICATIONS: There were no complications.  DIAGNOSTIC RECOMMENDATIONS: Successful PCI to the distal LCx. Continue DAPT. Aggressive medical management and risk factor modification.

## 2021-04-09 ENCOUNTER — APPOINTMENT (OUTPATIENT)
Dept: ELECTROPHYSIOLOGY | Facility: CLINIC | Age: 66
End: 2021-04-09

## 2021-04-23 ENCOUNTER — APPOINTMENT (OUTPATIENT)
Dept: ELECTROPHYSIOLOGY | Facility: CLINIC | Age: 66
End: 2021-04-23
Payer: COMMERCIAL

## 2021-04-23 ENCOUNTER — NON-APPOINTMENT (OUTPATIENT)
Age: 66
End: 2021-04-23

## 2021-04-23 VITALS
OXYGEN SATURATION: 97 % | RESPIRATION RATE: 16 BRPM | HEART RATE: 77 BPM | DIASTOLIC BLOOD PRESSURE: 85 MMHG | SYSTOLIC BLOOD PRESSURE: 134 MMHG | TEMPERATURE: 96.9 F | WEIGHT: 230 LBS | BODY MASS INDEX: 31.19 KG/M2

## 2021-04-23 DIAGNOSIS — E78.1 PURE HYPERGLYCERIDEMIA: ICD-10-CM

## 2021-04-23 PROCEDURE — 99072 ADDL SUPL MATRL&STAF TM PHE: CPT

## 2021-04-23 PROCEDURE — 99214 OFFICE O/P EST MOD 30 MIN: CPT

## 2021-04-23 PROCEDURE — 93000 ELECTROCARDIOGRAM COMPLETE: CPT

## 2021-04-23 NOTE — DISCUSSION/SUMMARY
[FreeTextEntry1] : Eloy Jovel is a 66 y/o man with Hx of HTN, HLD, CAD/MI s/p MI and PCI (2/2020) and recurrent paroxysmal afib s/p mulitple DCCV ([id number]) and ablation x2 (2012 and 2015), DCCV 6/10/2020, and now s/p PVI, MA flutter and substrate ablation on 3/24/2021 who presents today for routine f/u.\par \par Impression:\par \par 1. Paroxysmal afib/aflutter: s/p PVI, MA flutter and substrate ablation on 3/24/2021. EKG performed today to assess for recurrent afib/aflutter and reveals NSR. Self discontinued Eliquis and has been monitoring his pulse. Had brief palpitations but no sustained episodes. Remains on metoprolol 25mg daily. Verbalizes understanding of thromboembolic risk without junito of AC if afib is to recur. Has Hx of ulcer/bleeding ulcer so wishes to avoid long term AC use. Consider ILR for long term monitoring of afib recurrence and to allow for safe discontinuation of Eliquis moving forward. \par \par 2. HTN: resume oral antihypertensives as prescribed. Encouraged heart healthy diet, sodium restriction, and weight loss. Continue regular f/u with Cardiologist for further HTN management.\par \par 3. HLD: resume statin therapy as prescribed and regular f/u with Cardiologist for routine lipid monitoring and management.\par \par Will continue f/u with Cardiologist and may RTO as needed or if any new or worsening symptoms or findings occur.

## 2021-04-23 NOTE — HISTORY OF PRESENT ILLNESS
[FreeTextEntry1] : Perry Frankel, MD\par Dr Malou Watts\par \par Eloy Jovel is a 66 y/o man with Hx of HTN, HLD, CAD/MI s/p MI and PCI (2/2020) and recurrent paroxysmal afib s/p mulitple DCCV ([id number]) and ablation x2 (2012 and 2015), DCCV 6/10/2020, and now s/p PVI, MA flutter and substrate ablation on 3/24/2021 who presents today for routine f/u. Has been doing well post ablation. Denies chest pain, palpitations, SOB, syncope or near syncope. Right groin without pain, swelling, or bruising. Had a recent stress test and was unable to get his heart rate above 117 bpm so switched to nuclear test and was reportedly normal, noted scar from prior MI. Has been exercising daily with no issues or symptoms.

## 2021-07-09 ENCOUNTER — INPATIENT (INPATIENT)
Facility: HOSPITAL | Age: 66
LOS: 0 days | Discharge: LEFT BEFORE DISCHARGE | End: 2021-07-09
Attending: STUDENT IN AN ORGANIZED HEALTH CARE EDUCATION/TRAINING PROGRAM | Admitting: STUDENT IN AN ORGANIZED HEALTH CARE EDUCATION/TRAINING PROGRAM
Payer: MEDICARE

## 2021-07-09 VITALS
HEART RATE: 73 BPM | TEMPERATURE: 98 F | RESPIRATION RATE: 18 BRPM | DIASTOLIC BLOOD PRESSURE: 82 MMHG | OXYGEN SATURATION: 97 % | SYSTOLIC BLOOD PRESSURE: 142 MMHG

## 2021-07-09 VITALS
TEMPERATURE: 98 F | RESPIRATION RATE: 15 BRPM | OXYGEN SATURATION: 97 % | DIASTOLIC BLOOD PRESSURE: 87 MMHG | HEART RATE: 79 BPM | HEIGHT: 73 IN | SYSTOLIC BLOOD PRESSURE: 162 MMHG

## 2021-07-09 DIAGNOSIS — R07.9 CHEST PAIN, UNSPECIFIED: ICD-10-CM

## 2021-07-09 DIAGNOSIS — Z98.890 OTHER SPECIFIED POSTPROCEDURAL STATES: Chronic | ICD-10-CM

## 2021-07-09 DIAGNOSIS — Z29.9 ENCOUNTER FOR PROPHYLACTIC MEASURES, UNSPECIFIED: ICD-10-CM

## 2021-07-09 DIAGNOSIS — I10 ESSENTIAL (PRIMARY) HYPERTENSION: ICD-10-CM

## 2021-07-09 DIAGNOSIS — Z95.5 PRESENCE OF CORONARY ANGIOPLASTY IMPLANT AND GRAFT: Chronic | ICD-10-CM

## 2021-07-09 DIAGNOSIS — E78.5 HYPERLIPIDEMIA, UNSPECIFIED: ICD-10-CM

## 2021-07-09 DIAGNOSIS — K92.2 GASTROINTESTINAL HEMORRHAGE, UNSPECIFIED: ICD-10-CM

## 2021-07-09 DIAGNOSIS — I48.0 PAROXYSMAL ATRIAL FIBRILLATION: ICD-10-CM

## 2021-07-09 LAB
ALBUMIN SERPL ELPH-MCNC: 4.4 G/DL — SIGNIFICANT CHANGE UP (ref 3.3–5)
ALP SERPL-CCNC: 103 U/L — SIGNIFICANT CHANGE UP (ref 40–120)
ALT FLD-CCNC: 25 U/L — SIGNIFICANT CHANGE UP (ref 4–41)
ANION GAP SERPL CALC-SCNC: 11 MMOL/L — SIGNIFICANT CHANGE UP (ref 7–14)
APTT BLD: 39.7 SEC — HIGH (ref 27–36.3)
AST SERPL-CCNC: 34 U/L — SIGNIFICANT CHANGE UP (ref 4–40)
BASOPHILS # BLD AUTO: 0.03 K/UL — SIGNIFICANT CHANGE UP (ref 0–0.2)
BASOPHILS NFR BLD AUTO: 0.4 % — SIGNIFICANT CHANGE UP (ref 0–2)
BILIRUB SERPL-MCNC: 0.9 MG/DL — SIGNIFICANT CHANGE UP (ref 0.2–1.2)
BUN SERPL-MCNC: 15 MG/DL — SIGNIFICANT CHANGE UP (ref 7–23)
CALCIUM SERPL-MCNC: 10.3 MG/DL — SIGNIFICANT CHANGE UP (ref 8.4–10.5)
CHLORIDE SERPL-SCNC: 99 MMOL/L — SIGNIFICANT CHANGE UP (ref 98–107)
CO2 SERPL-SCNC: 26 MMOL/L — SIGNIFICANT CHANGE UP (ref 22–31)
CREAT SERPL-MCNC: 1.07 MG/DL — SIGNIFICANT CHANGE UP (ref 0.5–1.3)
EOSINOPHIL # BLD AUTO: 0.3 K/UL — SIGNIFICANT CHANGE UP (ref 0–0.5)
EOSINOPHIL NFR BLD AUTO: 4.2 % — SIGNIFICANT CHANGE UP (ref 0–6)
GLUCOSE SERPL-MCNC: 94 MG/DL — SIGNIFICANT CHANGE UP (ref 70–99)
HCT VFR BLD CALC: 46.2 % — SIGNIFICANT CHANGE UP (ref 39–50)
HGB BLD-MCNC: 15.5 G/DL — SIGNIFICANT CHANGE UP (ref 13–17)
IANC: 4.31 K/UL — SIGNIFICANT CHANGE UP (ref 1.5–8.5)
IMM GRANULOCYTES NFR BLD AUTO: 0.4 % — SIGNIFICANT CHANGE UP (ref 0–1.5)
INR BLD: 1.3 RATIO — HIGH (ref 0.88–1.16)
LYMPHOCYTES # BLD AUTO: 1.85 K/UL — SIGNIFICANT CHANGE UP (ref 1–3.3)
LYMPHOCYTES # BLD AUTO: 25.7 % — SIGNIFICANT CHANGE UP (ref 13–44)
MCHC RBC-ENTMCNC: 30.3 PG — SIGNIFICANT CHANGE UP (ref 27–34)
MCHC RBC-ENTMCNC: 33.5 GM/DL — SIGNIFICANT CHANGE UP (ref 32–36)
MCV RBC AUTO: 90.2 FL — SIGNIFICANT CHANGE UP (ref 80–100)
MONOCYTES # BLD AUTO: 0.67 K/UL — SIGNIFICANT CHANGE UP (ref 0–0.9)
MONOCYTES NFR BLD AUTO: 9.3 % — SIGNIFICANT CHANGE UP (ref 2–14)
NEUTROPHILS # BLD AUTO: 4.31 K/UL — SIGNIFICANT CHANGE UP (ref 1.8–7.4)
NEUTROPHILS NFR BLD AUTO: 60 % — SIGNIFICANT CHANGE UP (ref 43–77)
NRBC # BLD: 0 /100 WBCS — SIGNIFICANT CHANGE UP
NRBC # FLD: 0 K/UL — SIGNIFICANT CHANGE UP
PLATELET # BLD AUTO: 206 K/UL — SIGNIFICANT CHANGE UP (ref 150–400)
POTASSIUM SERPL-MCNC: 4.2 MMOL/L — SIGNIFICANT CHANGE UP (ref 3.5–5.3)
POTASSIUM SERPL-SCNC: 4.2 MMOL/L — SIGNIFICANT CHANGE UP (ref 3.5–5.3)
PROT SERPL-MCNC: 7.8 G/DL — SIGNIFICANT CHANGE UP (ref 6–8.3)
PROTHROM AB SERPL-ACNC: 14.8 SEC — HIGH (ref 10.6–13.6)
RBC # BLD: 5.12 M/UL — SIGNIFICANT CHANGE UP (ref 4.2–5.8)
RBC # FLD: 12.1 % — SIGNIFICANT CHANGE UP (ref 10.3–14.5)
SARS-COV-2 RNA SPEC QL NAA+PROBE: SIGNIFICANT CHANGE UP
SODIUM SERPL-SCNC: 136 MMOL/L — SIGNIFICANT CHANGE UP (ref 135–145)
TROPONIN T, HIGH SENSITIVITY RESULT: 16 NG/L — SIGNIFICANT CHANGE UP
TROPONIN T, HIGH SENSITIVITY RESULT: 16 NG/L — SIGNIFICANT CHANGE UP
WBC # BLD: 7.19 K/UL — SIGNIFICANT CHANGE UP (ref 3.8–10.5)
WBC # FLD AUTO: 7.19 K/UL — SIGNIFICANT CHANGE UP (ref 3.8–10.5)

## 2021-07-09 PROCEDURE — 99285 EMERGENCY DEPT VISIT HI MDM: CPT | Mod: CS,25,GC

## 2021-07-09 PROCEDURE — 93010 ELECTROCARDIOGRAM REPORT: CPT

## 2021-07-09 PROCEDURE — 99223 1ST HOSP IP/OBS HIGH 75: CPT

## 2021-07-09 PROCEDURE — 71046 X-RAY EXAM CHEST 2 VIEWS: CPT | Mod: 26

## 2021-07-09 RX ORDER — PANTOPRAZOLE SODIUM 20 MG/1
40 TABLET, DELAYED RELEASE ORAL
Refills: 0 | Status: DISCONTINUED | OUTPATIENT
Start: 2021-07-10 | End: 2021-07-09

## 2021-07-09 RX ORDER — LOSARTAN POTASSIUM 100 MG/1
1 TABLET, FILM COATED ORAL
Qty: 0 | Refills: 0 | DISCHARGE

## 2021-07-09 RX ORDER — OMEGA-3 ACID ETHYL ESTERS 1 G
1 CAPSULE ORAL
Qty: 0 | Refills: 0 | DISCHARGE

## 2021-07-09 RX ORDER — APIXABAN 2.5 MG/1
5 TABLET, FILM COATED ORAL
Refills: 0 | Status: DISCONTINUED | OUTPATIENT
Start: 2021-07-09 | End: 2021-07-09

## 2021-07-09 RX ORDER — PANTOPRAZOLE SODIUM 20 MG/1
40 TABLET, DELAYED RELEASE ORAL ONCE
Refills: 0 | Status: COMPLETED | OUTPATIENT
Start: 2021-07-09 | End: 2021-07-09

## 2021-07-09 RX ORDER — LOSARTAN POTASSIUM 100 MG/1
50 TABLET, FILM COATED ORAL DAILY
Refills: 0 | Status: DISCONTINUED | OUTPATIENT
Start: 2021-07-09 | End: 2021-07-09

## 2021-07-09 RX ORDER — METOPROLOL TARTRATE 50 MG
1 TABLET ORAL
Qty: 0 | Refills: 0 | DISCHARGE

## 2021-07-09 RX ORDER — PANTOPRAZOLE SODIUM 20 MG/1
40 TABLET, DELAYED RELEASE ORAL
Refills: 0 | Status: DISCONTINUED | OUTPATIENT
Start: 2021-07-09 | End: 2021-07-09

## 2021-07-09 RX ORDER — ROSUVASTATIN CALCIUM 5 MG/1
1 TABLET ORAL
Qty: 0 | Refills: 0 | DISCHARGE

## 2021-07-09 RX ORDER — PANTOPRAZOLE SODIUM 20 MG/1
40 TABLET, DELAYED RELEASE ORAL ONCE
Refills: 0 | Status: DISCONTINUED | OUTPATIENT
Start: 2021-07-09 | End: 2021-07-09

## 2021-07-09 RX ORDER — APIXABAN 2.5 MG/1
1 TABLET, FILM COATED ORAL
Qty: 0 | Refills: 0 | DISCHARGE

## 2021-07-09 RX ORDER — ACETAMINOPHEN 500 MG
650 TABLET ORAL EVERY 6 HOURS
Refills: 0 | Status: DISCONTINUED | OUTPATIENT
Start: 2021-07-09 | End: 2021-07-09

## 2021-07-09 RX ORDER — ASPIRIN/CALCIUM CARB/MAGNESIUM 324 MG
162 TABLET ORAL ONCE
Refills: 0 | Status: COMPLETED | OUTPATIENT
Start: 2021-07-09 | End: 2021-07-09

## 2021-07-09 RX ADMIN — LOSARTAN POTASSIUM 50 MILLIGRAM(S): 100 TABLET, FILM COATED ORAL at 14:08

## 2021-07-09 RX ADMIN — Medication 30 MILLILITER(S): at 16:05

## 2021-07-09 RX ADMIN — PANTOPRAZOLE SODIUM 40 MILLIGRAM(S): 20 TABLET, DELAYED RELEASE ORAL at 09:35

## 2021-07-09 RX ADMIN — APIXABAN 5 MILLIGRAM(S): 2.5 TABLET, FILM COATED ORAL at 18:29

## 2021-07-09 RX ADMIN — Medication 162 MILLIGRAM(S): at 09:54

## 2021-07-09 NOTE — DISCHARGE NOTE PROVIDER - HOSPITAL COURSE
*******Notified by RN that pt wants to leave AMA. Was admitted for CP, was to be seen by cardiology. Restarted on eliquis while inpatient. Pt was seen at bedside, risks/benefits discussed with patient in length at bedside. Pt verbalized understanding of risks including but not limited to worsening CP, heart attack and death but still choosing to leave AMA.*********    64 y/o male, with a PmHx of HTN, HLD, Afib s/p ablation x 3, (not on ac), MI s/p x 2 stents (2/2020), Bleeding Ulcer, presented to the [facility] ED with chest pain. Admitted to telemetry for r/o acs.     Problem/Plan - 1:  ·  Problem: Chest pain.  Plan: EKG/Telemetry, ce x 2 neg, echo ordered, recent stress test done 4/2021 (tried to obtain records), consider ischemic w/u, cardio c/s Dr. Purcell called, pt initially refused aspirin due to his history of GI bleed.      Problem/Plan - 2:  ·  Problem: PAF (paroxysmal atrial fibrillation).  Plan: CHADSVasc: 2, consider restarting metoprolol for rate control, restarted on Apixiban.      Problem/Plan - 3:  ·  Problem: GIB (gastrointestinal bleeding).  Plan: cont ppi.      Problem/Plan - 4:  ·  Problem: HLD (hyperlipidemia).  Plan: fasting lipid profile, consider restarting the statin.      Problem/Plan - 5:  ·  Problem: HTN (hypertension).  Plan: monitor bp, cont losartan, adjust as needed, consider restarting metoprolol if needed.      Problem/Plan - 6:  Problem: Need for prophylactic measure. Plan: On Apixiban, not needed.       Attestation Statements:  Attending supervision statement: I have personally seen and examined the patient.  I fully participated in the care of this patient.  I have made amendments to the documentation where necessary, and agree with the history, physical exam, and plan as documented by the ACP.     65 M h/o HTN, HLD, Afib s/p ablation x 3, (not on ac), MI s/p x 2 stents (2/2020), Bleeding Ulcer, presented to the [facility] ED with chest pain. Admitted to telemetry for r/o acs.  CP- Low suspicion for ACS. Suspect this is related to GERD, trops flat. Pain is related to food intake and worse when laying flat. Will start PPI, maalox prn. Monitor on tele for now. His cardiologist was called. Pt has h/o bleeding gastric ulcer and states this pain is similar- requesting GI eval .   *******Notified by RN that pt wants to leave AMA. Was admitted for CP, was to be seen by EP Dr. Castillo. Has PAF, restarted on eliquis while inpatient. S/w Cards/EP NP on call, advised to continue eliquis 5mg BID and f/u with outpatient cardiology. Pt states that he has a sufficient amount of medication at home. Advised to call service back if needs any additional meds. Pt was seen at bedside, risks/benefits discussed with patient in length at bedside. Pt verbalized understanding of risks including but not limited to worsening CP, heart attack and death but still choosing to leave AMA. Pt educated that if he has any worsening or new symptoms to return to ED immediately. Pt verbalized understanding*********    64 y/o male, with a PmHx of HTN, HLD, Afib s/p ablation x 3, (not on ac), MI s/p x 2 stents (2/2020), Bleeding Ulcer, presented to the [facility] ED with chest pain. Admitted to telemetry for r/o acs.     Problem/Plan - 1:  ·  Problem: Chest pain.  Plan: EKG/Telemetry, ce x 2 neg, echo ordered, recent stress test done 4/2021 (tried to obtain records), consider ischemic w/u, cardio c/s Dr. Purcell called, pt initially refused aspirin due to his history of GI bleed.      Problem/Plan - 2:  ·  Problem: PAF (paroxysmal atrial fibrillation).  Plan: CHADSVasc: 2, consider restarting metoprolol for rate control, restarted on Apixiban.      Problem/Plan - 3:  ·  Problem: GIB (gastrointestinal bleeding).  Plan: cont ppi.      Problem/Plan - 4:  ·  Problem: HLD (hyperlipidemia).  Plan: fasting lipid profile, consider restarting the statin.      Problem/Plan - 5:  ·  Problem: HTN (hypertension).  Plan: monitor bp, cont losartan, adjust as needed, consider restarting metoprolol if needed.      Problem/Plan - 6:  Problem: Need for prophylactic measure. Plan: On Apixiban, not needed.       Attestation Statements:  Attending supervision statement: I have personally seen and examined the patient.  I fully participated in the care of this patient.  I have made amendments to the documentation where necessary, and agree with the history, physical exam, and plan as documented by the ACP.     65 M h/o HTN, HLD, Afib s/p ablation x 3, (not on ac), MI s/p x 2 stents (2/2020), Bleeding Ulcer, presented to the [facility] ED with chest pain. Admitted to telemetry for r/o acs.  CP- Low suspicion for ACS. Suspect this is related to GERD, trops flat. Pain is related to food intake and worse when laying flat. Will start PPI, maalox prn. Monitor on tele for now. His cardiologist was called. Pt has h/o bleeding gastric ulcer and states this pain is similar- requesting GI eval .   *******Notified by RN that pt wants to leave AMA. Was admitted for CP, was to be seen by EP Dr. Csatillo. Has PAF, restarted on eliquis while inpatient. S/w Cards/EP NP on call, advised to continue eliquis 5mg BID and f/u with outpatient cardiology. Pt states that he has a sufficient amount of medication at home. Advised to call service back if needs any additional meds. Pt was seen at bedside, risks/benefits discussed with patient in length at bedside. Pt verbalized understanding of risks including but not limited to worsening CP, heart attack and death but still choosing to leave AMA. Pt educated that if he has any worsening or new symptoms to return to ED immediately. Pt verbalized understanding*********  Discussed with Nocturnist Dr. Sarkar.   64 y/o male, with a PmHx of HTN, HLD, Afib s/p ablation x 3, (not on ac), MI s/p x 2 stents (2/2020), Bleeding Ulcer, presented to the [facility] ED with chest pain. Admitted to telemetry for r/o acs.     Problem/Plan - 1:  ·  Problem: Chest pain.  Plan: EKG/Telemetry, ce x 2 neg, echo ordered, recent stress test done 4/2021 (tried to obtain records), consider ischemic w/u, cardio c/s Dr. Purcell called, pt initially refused aspirin due to his history of GI bleed.      Problem/Plan - 2:  ·  Problem: PAF (paroxysmal atrial fibrillation).  Plan: CHADSVasc: 2, consider restarting metoprolol for rate control, restarted on Apixiban.      Problem/Plan - 3:  ·  Problem: GIB (gastrointestinal bleeding).  Plan: cont ppi.      Problem/Plan - 4:  ·  Problem: HLD (hyperlipidemia).  Plan: fasting lipid profile, consider restarting the statin.      Problem/Plan - 5:  ·  Problem: HTN (hypertension).  Plan: monitor bp, cont losartan, adjust as needed, consider restarting metoprolol if needed.      Problem/Plan - 6:  Problem: Need for prophylactic measure. Plan: On Apixiban, not needed.

## 2021-07-09 NOTE — H&P ADULT - ASSESSMENT
64 y/o male, with a PmHx of HTN, HLD, Afib s/p ablation x 3, (not on ac), MI s/p x 2 stents (2/2020), Bleeding Ulcer, presented to the [facility] ED with chest pain. Admitted to telemetry for r/o acs.

## 2021-07-09 NOTE — H&P ADULT - HISTORY OF PRESENT ILLNESS
64 y/o male, with a PmHx of HTN, HLD, Afib s/p ablation x 3, (not on ac), MI s/p x 2 stents (2/2020), Bleeding Ulcer, presented to the [facility] ED with chest pain. Pt states yesterday afternoon, while working as an , he started to get a left sided, burning sensation in his chest. After he got home at the end of his shift, the "burning" sensation was still present. He states he was having associated symptoms of belching at that time. He states he had stopped taking most of his home medications that he was prescribed with the exception of losartan because he said he was diagnosed with a bleeding ulcer so he stopped all his medications on his own. Then yesterday, when he started to get the chest pain, he states he restarted the Apixiban and Protonix but still did not take the metoprolol or crestor. After taking the Apixiban and Protonix he went to bed. This morning, when he woke up from sleep, the chest pain was still there so he got scared and came to the [facility] ED for evaluation. He states the pain was about a 10/10, left sided, burning sensation with radiation to his left shoulder. He states this sensation was different from when he had a heart attack last February. He denies any fever, chills, sob, HA, dizziness, blurred vision, abd pain, n/v, sick contacts, recent travel. Pt states back in 4/2021, he had a stress test with his primary cardiologist. He was told the stress test was abnormal but states they told him it was okay (possible fixed defects from previous MI, pt was unsure the exact findings). Currently, he states the chest pain is about a 6-7/10. He appears comfortable at this time and is now being admitted to telemetry for r/o acs.

## 2021-07-09 NOTE — H&P ADULT - ATTENDING COMMENTS
65 M h/o HTN, HLD, Afib s/p ablation x 3, (not on ac), MI s/p x 2 stents (2/2020), Bleeding Ulcer, presented to the [facility] ED with chest pain. Admitted to telemetry for r/o acs.  CP- Low suspicion for ACS. Suspect this is related to GERD, trops flat. Pain is related to food intake and worse when laying flat. Will start PPI, maalox prn. Monitor on tele for now. His cardiologist was called. Pt has h/o bleeding gastric ulcer and states this pain is similar- requesting GI eval

## 2021-07-09 NOTE — ED PROVIDER NOTE - NS ED ROS FT
General: Denies fevers  Eyes: Denies vision changes  ENMT: Denies sore throat  CV: +chest pain  Resp: Denies SOB  GI: Denies abdominal pain, nausea, vomiting, diarrhea  : Denies painful urination  Skin: Denies new rashes  Neuro: Denies headache, focal weakness  MSK: +CP radiates to L shoulder and back. Denies recent trauma

## 2021-07-09 NOTE — DISCHARGE NOTE PROVIDER - NSDCCPCAREPLAN_GEN_ALL_CORE_FT
PRINCIPAL DISCHARGE DIAGNOSIS  Diagnosis: Chest pain  Assessment and Plan of Treatment: Pt is leaving AMA

## 2021-07-09 NOTE — ED ADULT NURSE NOTE - OBJECTIVE STATEMENT
Pt arrives to room 3, A&Ox4. Pt ambulatory at baseline, NAD. Pt endorses Sharp midsternal chest pain that radiates to the left arm, that started this am. Pt has extensive cardiac history last year suffered an MI two stents placed, PT has a-fib (NSR on cardiac monitor at the moment). Pt normally takes eliquis but stopped taking it by his own discretion 3 weeks ago, took one dose this am. Breathing regular and unlabored. Pt denies; shortness of breath, headache dizziness, bowel/urinary irregularities, n/v, diarrhea. 20G  placed in R AC, labs drawn and sent as ordered, awaiting further orders.

## 2021-07-09 NOTE — H&P ADULT - NSHPSOCIALHISTORY_GEN_ALL_CORE
Marital Status:     Occupation: Retired ; now works as a part time     Tobacco Use: denies    ETOH Use: denies    Drug Use: denies    Flu Vaccine: 9/2020          Pneumonia Vaccine:    2 yrs ago           COVID Vaccine: denies

## 2021-07-09 NOTE — ED ADULT NURSE NOTE - NSIMPLEMENTINTERV_GEN_ALL_ED
Implemented All Fall with Harm Risk Interventions:  [place] to call system. Call bell, personal items and telephone within reach. Instruct patient to call for assistance. Room bathroom lighting operational. Non-slip footwear when patient is off stretcher. Physically safe environment: no spills, clutter or unnecessary equipment. Stretcher in lowest position, wheels locked, appropriate side rails in place. Provide visual cue, wrist band, yellow gown, etc. Monitor gait and stability. Monitor for mental status changes and reorient to person, place, and time. Review medications for side effects contributing to fall risk. Reinforce activity limits and safety measures with patient and family. Provide visual clues: red socks.

## 2021-07-09 NOTE — H&P ADULT - NSHPOUTPATIENTPROVIDERS_GEN_ALL_CORE
PCP: Dr. Watts  Cardio: Dr. Schroeder [phone]  EP: Dr. Louis Purcell [phone]  GI: Dr. Feliciano [phone]

## 2021-07-09 NOTE — H&P ADULT - NSICDXPASTMEDICALHX_GEN_ALL_CORE_FT
PAST MEDICAL HISTORY:  Acute myocardial infarction 2/2020 w/stents    GIB (gastrointestinal bleeding) 2013    HLD (hyperlipidemia)     HTN (hypertension)     Leiomyosarcoma left arm    PAF (paroxysmal atrial fibrillation) On Eliquis

## 2021-07-09 NOTE — ED PROVIDER NOTE - ATTENDING CONTRIBUTION TO CARE
agree with resident note    "65M PMH HTN, HLD, AF s/p ablation x 3 (not on AC), CAD s/p 2 stents p/w CP x 1 day. Pt reports L CP radiating to L shoulder and back, started last night unprovoked. Occurs intermittently. Nonexertional. No associated n/v/sweats, SOB. Had abnormal nuclear stress test in April. No longer on ASA/Plavix due to large duodenal ulcer. Took himself on Eliquis previously on his own due to concern about internal bleeding & after AF ablation was successful, however took a dose last night and today. No leg pain/swelling, h/o DVT/PE."    PE: well appearing; VSS; CTAB/L; s1 s2 no m/r/g abd soft/NT/ND ext: no edema    EKG: wnl  will get labs including trops, CXR, and admit

## 2021-07-09 NOTE — PHARMACOTHERAPY INTERVENTION NOTE - COMMENTS
Medication history is complete. Medication list updated in Outpatient Medication Record (OMR). Medication history obtained from patient at bedside who had list of medications. Please call spectra [phone] if you have any questions.

## 2021-07-09 NOTE — ED PROVIDER NOTE - PROGRESS NOTE DETAILS
Juarez, PGY3 - Pt refusing 81mg ASA for concern of duodenal ulcer. Spoke to Dr. Purcell, requesting admission to hospitalist, will see as inpatient.

## 2021-07-09 NOTE — ED PROVIDER NOTE - PMH
CAD (coronary artery disease)  s/p stent placement 2/2020  GIB (gastrointestinal bleeding)  2013  HLD (hyperlipidemia)    HTN (hypertension)    PAF (paroxysmal atrial fibrillation)  On Eliquis

## 2021-07-09 NOTE — H&P ADULT - NSICDXFAMILYHX_GEN_ALL_CORE_FT
FAMILY HISTORY:  FH ischemic heart disease, Mother  FH: aortic aneurysm, Brother  FHx: heart failure, Father

## 2021-07-09 NOTE — ED PROVIDER NOTE - OBJECTIVE STATEMENT
65M PMH HTN, HLD, AF s/p ablation x 3 (not on AC), CAD s/p 2 stents p/w CP x 1 day. Pt reports L CP radiating to L shoulder and back, started last night unprovoked. Occurs intermittently. Nonexertional. No associated n/v/sweats, SOB. Had abnormal nuclear stress test in April. No longer on ASA/Plavix due to large duodenal ulcer. Took himself on Eliquis previously on his own due to concern about internal bleeding & after AF ablation was successful, however took a dose last night and today. No leg pain/swelling, h/o DVT/PE.      Cards Dr. Schroeder ([facility]) [phone]  Dr. Louis Purcell [phone]

## 2021-07-09 NOTE — ED ADULT TRIAGE NOTE - CHIEF COMPLAINT QUOTE
pt c/o midsternal chest pain radiating to the left shoulder/armpit since waking up this AM. Denies shortness of breath. PMH: afib (on eliquis), MI 2020 stentsx2, HLD, HTN

## 2021-07-09 NOTE — ED PROVIDER NOTE - CLINICAL SUMMARY MEDICAL DECISION MAKING FREE TEXT BOX
Juarez, PGY3 - 65M PMH includes CAD with stents not on antiplatelet therapy p/w CP x 1 day. Recent abnormal nuclear stress test. EKG sinus rhythm with PACs, no acute ischemic changes. VSS, well-appearing. Concern for ACS, low suspicion for aortic dissection, PE. Plan: labs, cxr, ASA, TBA tele at minimum for further cardiac w/u

## 2021-07-09 NOTE — H&P ADULT - PROBLEM SELECTOR PLAN 1
EKG/Telemetry, ce x 2 neg, echo ordered, recent stress test done 4/2021 (tried to obtain records), consider ischemic w/u, cardio c/s Dr. Purcell called, pt initially refused aspirin due to his history of GI bleed

## 2021-07-09 NOTE — H&P ADULT - NSHPPHYSICALEXAM_GEN_ALL_CORE
Vital Signs Last 24 Hrs  T(C): 36.4 (09 Jul 2021 08:35), Max: 36.4 (09 Jul 2021 08:35)  T(F): 97.5 (09 Jul 2021 08:35), Max: 97.5 (09 Jul 2021 08:35)  HR: 68 (09 Jul 2021 09:14) (68 - 79)  BP: 157/82 (09 Jul 2021 09:14) (157/82 - 162/87)  BP(mean): --  RR: 16 (09 Jul 2021 09:14) (15 - 16)  SpO2: 98% (09 Jul 2021 09:14) (97% - 98%)    EKG: NSR @ 71, PAC's

## 2021-07-09 NOTE — H&P ADULT - NSICDXPASTSURGICALHX_GEN_ALL_CORE_FT
PAST SURGICAL HISTORY:  H/O hand surgery Left arm surgery - Leimyosarcoma resection    History of nasal surgery     History of shoulder surgery left shoulder tendon repair    S/P ablation of atrial fibrillation x 3    S/P coronary artery stent placement 2/2020    S/P inguinal hernia repair Bilateral

## 2021-07-09 NOTE — DISCHARGE NOTE PROVIDER - NSDCMRMEDTOKEN_GEN_ALL_CORE_FT
Eliquis 5 mg oral tablet: 1 tab(s) orally 2 times a day  losartan 50 mg oral tablet: 1 tab(s) orally once a day  pantoprazole 40 mg oral granule, delayed release: 1 each orally once a day

## 2022-01-17 PROBLEM — I21.9 ACUTE MYOCARDIAL INFARCTION, UNSPECIFIED: Chronic | Status: ACTIVE | Noted: 2021-07-09

## 2022-01-17 PROBLEM — C49.9 MALIGNANT NEOPLASM OF CONNECTIVE AND SOFT TISSUE, UNSPECIFIED: Chronic | Status: ACTIVE | Noted: 2021-07-09

## 2022-02-15 ENCOUNTER — APPOINTMENT (OUTPATIENT)
Dept: INTERNAL MEDICINE | Facility: CLINIC | Age: 67
End: 2022-02-15
Payer: MEDICARE

## 2022-02-15 VITALS
SYSTOLIC BLOOD PRESSURE: 150 MMHG | RESPIRATION RATE: 16 BRPM | HEIGHT: 72 IN | OXYGEN SATURATION: 96 % | HEART RATE: 70 BPM | WEIGHT: 238.3 LBS | BODY MASS INDEX: 32.28 KG/M2 | TEMPERATURE: 97.1 F | DIASTOLIC BLOOD PRESSURE: 79 MMHG

## 2022-02-15 DIAGNOSIS — I48.92 UNSPECIFIED ATRIAL FLUTTER: ICD-10-CM

## 2022-02-15 DIAGNOSIS — Z01.818 ENCOUNTER FOR OTHER PREPROCEDURAL EXAMINATION: ICD-10-CM

## 2022-02-15 DIAGNOSIS — Z86.79 PERSONAL HISTORY OF OTHER DISEASES OF THE CIRCULATORY SYSTEM: ICD-10-CM

## 2022-02-15 DIAGNOSIS — Z86.16 PERSONAL HISTORY OF COVID-19: ICD-10-CM

## 2022-02-15 DIAGNOSIS — I25.10 ATHEROSCLEROTIC HEART DISEASE OF NATIVE CORONARY ARTERY W/OUT ANGINA PECTORIS: ICD-10-CM

## 2022-02-15 DIAGNOSIS — R53.83 OTHER MALAISE: ICD-10-CM

## 2022-02-15 DIAGNOSIS — Z86.79 OTHER SPECIFIED POSTPROCEDURAL STATES: ICD-10-CM

## 2022-02-15 DIAGNOSIS — I10 ESSENTIAL (PRIMARY) HYPERTENSION: ICD-10-CM

## 2022-02-15 DIAGNOSIS — I25.2 OLD MYOCARDIAL INFARCTION: ICD-10-CM

## 2022-02-15 DIAGNOSIS — Z98.890 OTHER SPECIFIED POSTPROCEDURAL STATES: ICD-10-CM

## 2022-02-15 DIAGNOSIS — M51.26 OTHER INTERVERTEBRAL DISC DISPLACEMENT, LUMBAR REGION: ICD-10-CM

## 2022-02-15 DIAGNOSIS — R53.81 OTHER MALAISE: ICD-10-CM

## 2022-02-15 DIAGNOSIS — Z95.5 PRESENCE OF CORONARY ANGIOPLASTY IMPLANT AND GRAFT: ICD-10-CM

## 2022-02-15 DIAGNOSIS — Z00.00 ENCOUNTER FOR GENERAL ADULT MEDICAL EXAMINATION W/OUT ABNORMAL FINDINGS: ICD-10-CM

## 2022-02-15 DIAGNOSIS — I48.0 PAROXYSMAL ATRIAL FIBRILLATION: ICD-10-CM

## 2022-02-15 DIAGNOSIS — Z82.49 FAMILY HISTORY OF ISCHEMIC HEART DISEASE AND OTHER DISEASES OF THE CIRCULATORY SYSTEM: ICD-10-CM

## 2022-02-15 DIAGNOSIS — Z83.438 FAMILY HISTORY OF OTHER DISORDER OF LIPOPROTEIN METABOLISM AND OTHER LIPIDEMIA: ICD-10-CM

## 2022-02-15 DIAGNOSIS — R53.83 OTHER FATIGUE: ICD-10-CM

## 2022-02-15 DIAGNOSIS — R10.11 RIGHT UPPER QUADRANT PAIN: ICD-10-CM

## 2022-02-15 DIAGNOSIS — K21.00 GASTRO-ESOPHAGEAL REFLUX DISEASE WITH ESOPHAGITIS, WITHOUT BLEEDING: ICD-10-CM

## 2022-02-15 PROCEDURE — G0402 INITIAL PREVENTIVE EXAM: CPT

## 2022-02-15 PROCEDURE — G0439: CPT

## 2022-02-15 PROCEDURE — 36415 COLL VENOUS BLD VENIPUNCTURE: CPT

## 2022-02-15 PROCEDURE — 99204 OFFICE O/P NEW MOD 45 MIN: CPT | Mod: 25

## 2022-02-15 RX ORDER — CHROMIUM 200 MCG
TABLET ORAL
Refills: 0 | Status: ACTIVE | COMMUNITY

## 2022-02-15 RX ORDER — MULTIVITAMIN
TABLET ORAL
Refills: 0 | Status: ACTIVE | COMMUNITY

## 2022-02-15 RX ORDER — MULTIVIT-MIN/IRON/FOLIC ACID/K 18-600-40
400 CAPSULE ORAL
Refills: 0 | Status: ACTIVE | COMMUNITY

## 2022-02-15 RX ORDER — UBIQUINOL 100 MG
CAPSULE ORAL
Refills: 0 | Status: ACTIVE | COMMUNITY

## 2022-02-15 RX ORDER — APIXABAN 5 MG/1
5 TABLET, FILM COATED ORAL
Qty: 28 | Refills: 0 | Status: DISCONTINUED | COMMUNITY
Start: 2020-03-10 | End: 2022-02-15

## 2022-02-15 RX ORDER — ARGININE HCL 1000 MG
TABLET ORAL
Refills: 0 | Status: ACTIVE | COMMUNITY

## 2022-03-02 PROBLEM — I10 HTN (HYPERTENSION): Status: ACTIVE | Noted: 2019-10-15

## 2022-03-02 LAB
25(OH)D3 SERPL-MCNC: 35.8 NG/ML
ALBUMIN SERPL ELPH-MCNC: 4.3 G/DL
ALP BLD-CCNC: 82 U/L
ALT SERPL-CCNC: 21 U/L
ANION GAP SERPL CALC-SCNC: 12 MMOL/L
APPEARANCE: CLEAR
AST SERPL-CCNC: 30 U/L
BACTERIA: NEGATIVE
BASOPHILS # BLD AUTO: 0.02 K/UL
BASOPHILS NFR BLD AUTO: 0.3 %
BILIRUB SERPL-MCNC: 0.7 MG/DL
BILIRUBIN URINE: NEGATIVE
BLOOD URINE: NEGATIVE
BUN SERPL-MCNC: 12 MG/DL
CALCIUM SERPL-MCNC: 9.5 MG/DL
CHLORIDE SERPL-SCNC: 104 MMOL/L
CHOLEST SERPL-MCNC: 133 MG/DL
CO2 SERPL-SCNC: 26 MMOL/L
COLOR: YELLOW
CREAT SERPL-MCNC: 1.03 MG/DL
CREAT SPEC-SCNC: 171 MG/DL
EOSINOPHIL # BLD AUTO: 0.22 K/UL
EOSINOPHIL NFR BLD AUTO: 3.1 %
ESTIMATED AVERAGE GLUCOSE: 117 MG/DL
FERRITIN SERPL-MCNC: 121 NG/ML
FOLATE SERPL-MCNC: 17.6 NG/ML
GLUCOSE QUALITATIVE U: NEGATIVE
GLUCOSE SERPL-MCNC: 104 MG/DL
HBA1C MFR BLD HPLC: 5.7 %
HCT VFR BLD CALC: 44.7 %
HDLC SERPL-MCNC: 44 MG/DL
HGB BLD-MCNC: 14.4 G/DL
HYALINE CASTS: 0 /LPF
IMM GRANULOCYTES NFR BLD AUTO: 0.3 %
IRON SATN MFR SERPL: 21 %
IRON SERPL-MCNC: 66 UG/DL
KETONES URINE: NEGATIVE
LDLC SERPL CALC-MCNC: 68 MG/DL
LEUKOCYTE ESTERASE URINE: NEGATIVE
LYMPHOCYTES # BLD AUTO: 2.01 K/UL
LYMPHOCYTES NFR BLD AUTO: 28.1 %
MAGNESIUM SERPL-MCNC: 2.3 MG/DL
MAN DIFF?: NORMAL
MCHC RBC-ENTMCNC: 30.3 PG
MCHC RBC-ENTMCNC: 32.2 GM/DL
MCV RBC AUTO: 94.1 FL
MICROALBUMIN 24H UR DL<=1MG/L-MCNC: <1.2 MG/DL
MICROALBUMIN/CREAT 24H UR-RTO: NORMAL MG/G
MICROSCOPIC-UA: NORMAL
MONOCYTES # BLD AUTO: 0.72 K/UL
MONOCYTES NFR BLD AUTO: 10.1 %
NEUTROPHILS # BLD AUTO: 4.17 K/UL
NEUTROPHILS NFR BLD AUTO: 58.1 %
NITRITE URINE: NEGATIVE
NONHDLC SERPL-MCNC: 89 MG/DL
PH URINE: 6
PLATELET # BLD AUTO: 190 K/UL
POTASSIUM SERPL-SCNC: 4.9 MMOL/L
PROT SERPL-MCNC: 6.8 G/DL
PROTEIN URINE: NEGATIVE
PSA FREE FLD-MCNC: 18 %
PSA FREE SERPL-MCNC: 0.35 NG/ML
PSA SERPL-MCNC: 2 NG/ML
RBC # BLD: 4.75 M/UL
RBC # FLD: 12.6 %
RED BLOOD CELLS URINE: 1 /HPF
SODIUM SERPL-SCNC: 142 MMOL/L
SPECIFIC GRAVITY URINE: 1.02
SQUAMOUS EPITHELIAL CELLS: 0 /HPF
TESTOST FREE SERPL-MCNC: 5.1 PG/ML
TESTOST SERPL-MCNC: 263 NG/DL
TIBC SERPL-MCNC: 314 UG/DL
TRIGL SERPL-MCNC: 103 MG/DL
TSH SERPL-ACNC: 0.69 UIU/ML
UIBC SERPL-MCNC: 248 UG/DL
UROBILINOGEN URINE: NORMAL
VIT B12 SERPL-MCNC: 636 PG/ML
WBC # FLD AUTO: 7.16 K/UL
WHITE BLOOD CELLS URINE: 0 /HPF

## 2022-03-02 NOTE — HEALTH RISK ASSESSMENT
[Never] : Never [Yes] : Yes [No] : In the past 12 months have you used drugs other than those required for medical reasons? No [No falls in past year] : Patient reported no falls in the past year [0] : 2) Feeling down, depressed, or hopeless: Not at all (0) [QTD0Teati] : 0 [Patient reported colonoscopy was normal] : Patient reported colonoscopy was normal [Change in mental status noted] : No change in mental status noted [Language] : denies difficulty with language [Behavior] : denies difficulty with behavior [Learning/Retaining New Information] : denies difficulty learning/retaining new information [Handling Complex Tasks] : denies difficulty handling complex tasks [Reasoning] : denies difficulty with reasoning [Spatial Ability and Orientation] : denies difficulty with spatial ability and orientation [None] : None [With Family] : lives with family [] :  [Retired] : retired [Sexually Active] : sexually active [High Risk Behavior] : no high risk behavior [Feels Safe at Home] : Feels safe at home [Fully functional (bathing, dressing, toileting, transferring, walking, feeding)] : Fully functional (bathing, dressing, toileting, transferring, walking, feeding) [Fully functional (using the telephone, shopping, preparing meals, housekeeping, doing laundry, using] : Fully functional and needs no help or supervision to perform IADLs (using the telephone, shopping, preparing meals, housekeeping, doing laundry, using transportation, managing medications and managing finances) [Reports changes in hearing] : Reports no changes in hearing [Reports changes in vision] : Reports no changes in vision [Reports changes in dental health] : Reports no changes in dental health [ColonoscopyDate] : 2020

## 2022-03-02 NOTE — HISTORY OF PRESENT ILLNESS
[de-identified] : 65 y/o male patient is new to the office presents today for AWV/establish care with new PCP \par - HCM: colonoscopy  2 years ago, benign polyp, repeat 3 years\par - f/u HTN/A. Fib/s/p ablation/CAD s/p stent/hyperlipidemia/hx of MI - patient following with cardiology Dr. Purcell last seen 04/2021, last Echo 2020, BP elevated today in office, denies any CP/SOB/Palpitations/edema\par - c/o intermittent RUQ pain after eating, no N/V/D/C

## 2022-03-02 NOTE — PHYSICAL EXAM
[No Acute Distress] : no acute distress [Well Nourished] : well nourished [Well Developed] : well developed [Well-Appearing] : well-appearing [Normal Sclera/Conjunctiva] : normal sclera/conjunctiva [PERRL] : pupils equal round and reactive to light [Normal Outer Ear/Nose] : the outer ears and nose were normal in appearance [EOMI] : extraocular movements intact [Normal Oropharynx] : the oropharynx was normal [Normal TMs] : both tympanic membranes were normal [Normal Nasal Mucosa] : the nasal mucosa was normal [No JVD] : no jugular venous distention [No Lymphadenopathy] : no lymphadenopathy [Supple] : supple [Thyroid Normal, No Nodules] : the thyroid was normal and there were no nodules present [No Respiratory Distress] : no respiratory distress  [No Accessory Muscle Use] : no accessory muscle use [Clear to Auscultation] : lungs were clear to auscultation bilaterally [Normal Rate] : normal rate  [Regular Rhythm] : with a regular rhythm [Normal S1, S2] : normal S1 and S2 [No Murmur] : no murmur heard [No Carotid Bruits] : no carotid bruits [No Abdominal Bruit] : a ~M bruit was not heard ~T in the abdomen [No Varicosities] : no varicosities [Pedal Pulses Present] : the pedal pulses are present [No Edema] : there was no peripheral edema [No Palpable Aorta] : no palpable aorta [No Extremity Clubbing/Cyanosis] : no extremity clubbing/cyanosis [Soft] : abdomen soft [Non Tender] : non-tender [Non-distended] : non-distended [No Masses] : no abdominal mass palpated [No HSM] : no HSM [Normal Bowel Sounds] : normal bowel sounds [Normal Posterior Cervical Nodes] : no posterior cervical lymphadenopathy [Normal Anterior Cervical Nodes] : no anterior cervical lymphadenopathy [No CVA Tenderness] : no CVA  tenderness [No Spinal Tenderness] : no spinal tenderness [No Joint Swelling] : no joint swelling [Grossly Normal Strength/Tone] : grossly normal strength/tone [No Rash] : no rash [Coordination Grossly Intact] : coordination grossly intact [No Focal Deficits] : no focal deficits [Normal Gait] : normal gait [Deep Tendon Reflexes (DTR)] : deep tendon reflexes were 2+ and symmetric [Speech Grossly Normal] : speech grossly normal [Memory Grossly Normal] : memory grossly normal [Normal Affect] : the affect was normal [Alert and Oriented x3] : oriented to person, place, and time [Normal Mood] : the mood was normal [Normal Insight/Judgement] : insight and judgment were intact

## 2022-04-06 NOTE — H&P CARDIOLOGY - NSALCOHOLUSECOMMENT_GEN_ALL_CORE_FT
----- Message from [place] [facility] [place] Jorden Runner, please advise.  ---------------------------------------------------------------------------  --------------  Irasema Chandler INFO  What is the best way for the office to contact you? OK to leave message on   voicemail  Preferred Call Back Phone Number? [phone]  ---------------------------------------------------------------------------  --------------  SCRIPT ANSWERS  Relationship to Patient?  Self Occasional

## 2022-05-05 NOTE — H&P ADULT - BACK
[facility] requesting refill rx    Disp Refills Start End    sertraline (ZOLOFT) 25 MG tablet 15 tablet 1 3/7/2022     Sig - Route: Take 0.5 tablets by mouth every morning. - Oral    Sent to pharmacy as: Sertraline HCl 25 MG Oral Tablet (ZOLOFT)    Class: Eprescribe    E-Prescribing Status: Receipt confirmed by pharmacy (3/7/2022 12:02 PM CST)        Last appointment: 03/07/2022  Follow up advised: 4-6 weeks  Appointment: none scheduled. Message sent to schedule.  Rx refilled per protocol.  
No deformity or limitation of movement

## 2023-06-15 NOTE — H&P PST ADULT - MUSCULOSKELETAL
details… No joint pain, swelling or deformity; no limitation of movement IV discontinued, cath removed intact

## 2023-12-20 ENCOUNTER — APPOINTMENT (OUTPATIENT)
Dept: ORTHOPEDIC SURGERY | Facility: CLINIC | Age: 68
End: 2023-12-20
Payer: MEDICARE

## 2023-12-20 VITALS — BODY MASS INDEX: 32.23 KG/M2 | HEIGHT: 72 IN | WEIGHT: 238 LBS

## 2023-12-20 DIAGNOSIS — R22.32 LOCALIZED SWELLING, MASS AND LUMP, LEFT UPPER LIMB: ICD-10-CM

## 2023-12-20 PROCEDURE — 99204 OFFICE O/P NEW MOD 45 MIN: CPT

## 2023-12-20 PROCEDURE — 73140 X-RAY EXAM OF FINGER(S): CPT | Mod: LT

## 2023-12-20 NOTE — ASSESSMENT
[FreeTextEntry1] : We reviewed the pathology and treatment options- patient aware that options include observation, aspiration, surgery- aspiration with 50/50 chance of resolution, surgery usually 95-97% effective, although some studies indicate that recurrence may be as high as 25%. Today the patient is amenable to surgical excision,  Understands that with volar masses chance of arterial injury which may result in ecchymosis and occasional hematoma, recurrence, stiffness due to dissection to the joint capsule as well as general risks of surgery- bleeding, infection, injury to nerves, vessels or tendons, need for future surgery, loss of limb, loss of life. all questions answered and patient agrees to the surgical plan.  post op protocol and expectations were reviewed.

## 2023-12-20 NOTE — HISTORY OF PRESENT ILLNESS
[6] : 6 [4] : 4 [Dull/Aching] : dull/aching [Sharp] : sharp [Intermittent] : intermittent [Nothing helps with pain getting better] : Nothing helps with pain getting better [de-identified] : 12/20/23:  Pt has a mass on his left middle finger DIP. [] : no [FreeTextEntry1] : LT mid Finger [FreeTextEntry5] : Pt has a cysts on his finger would like it aspirated.

## 2023-12-20 NOTE — IMAGING
[de-identified] : left middle finger: tender mass over DIP farom nvid [Left] : left fingers [Degenerative change] : Degenerative change

## 2024-01-09 ENCOUNTER — APPOINTMENT (OUTPATIENT)
Dept: ELECTROPHYSIOLOGY | Facility: CLINIC | Age: 69
End: 2024-01-09

## 2024-01-11 RX ORDER — ACETAMINOPHEN AND CODEINE PHOSPHATE 300; 30 MG/1; MG/1
300-30 TABLET ORAL EVERY 6 HOURS
Qty: 12 | Refills: 0 | Status: ACTIVE | COMMUNITY
Start: 2024-01-11 | End: 1900-01-01

## 2024-01-12 ENCOUNTER — APPOINTMENT (OUTPATIENT)
Age: 69
End: 2024-01-12

## 2024-01-24 ENCOUNTER — APPOINTMENT (OUTPATIENT)
Dept: ORTHOPEDIC SURGERY | Facility: CLINIC | Age: 69
End: 2024-01-24